# Patient Record
Sex: MALE | Race: WHITE | NOT HISPANIC OR LATINO | Employment: OTHER | URBAN - METROPOLITAN AREA
[De-identification: names, ages, dates, MRNs, and addresses within clinical notes are randomized per-mention and may not be internally consistent; named-entity substitution may affect disease eponyms.]

---

## 2018-02-10 ENCOUNTER — HOSPITAL ENCOUNTER (INPATIENT)
Facility: HOSPITAL | Age: 33
LOS: 3 days | Discharge: HOME | DRG: 186 | End: 2018-02-14
Attending: EMERGENCY MEDICINE | Admitting: HOSPITALIST
Payer: MEDICAID

## 2018-02-10 DIAGNOSIS — J90 PLEURAL EFFUSION: ICD-10-CM

## 2018-02-10 DIAGNOSIS — J18.9 PNEUMONIA OF RIGHT LOWER LOBE DUE TO INFECTIOUS ORGANISM: ICD-10-CM

## 2018-02-10 DIAGNOSIS — J90 PLEURAL EFFUSION ON RIGHT: Primary | ICD-10-CM

## 2018-02-10 DIAGNOSIS — R07.9 CHEST PAIN: ICD-10-CM

## 2018-02-10 DIAGNOSIS — J86.9 EMPYEMA: ICD-10-CM

## 2018-02-10 PROCEDURE — 99285 EMERGENCY DEPT VISIT HI MDM: CPT | Mod: 25

## 2018-02-10 PROCEDURE — 93005 ELECTROCARDIOGRAM TRACING: CPT

## 2018-02-10 PROCEDURE — 36000 PLACE NEEDLE IN VEIN: CPT

## 2018-02-10 PROCEDURE — 11000001 HC ACUTE MED/SURG PRIVATE ROOM

## 2018-02-10 RX ORDER — CEFTRIAXONE 1 G/1
1 INJECTION, POWDER, FOR SOLUTION INTRAMUSCULAR; INTRAVENOUS
Status: COMPLETED | OUTPATIENT
Start: 2018-02-10 | End: 2018-02-11

## 2018-02-11 ENCOUNTER — OUTSIDE PLACE OF SERVICE (OUTPATIENT)
Dept: PULMONOLOGY | Facility: CLINIC | Age: 33
End: 2018-02-11
Payer: MEDICAID

## 2018-02-11 PROBLEM — E87.1 HYPONATREMIA: Status: ACTIVE | Noted: 2018-02-11

## 2018-02-11 PROBLEM — J98.11 ATELECTASIS: Status: ACTIVE | Noted: 2018-02-11

## 2018-02-11 PROBLEM — J90 PLEURAL EFFUSION ON RIGHT: Status: ACTIVE | Noted: 2018-02-11

## 2018-02-11 PROBLEM — E87.6 HYPOKALEMIA: Status: ACTIVE | Noted: 2018-02-11

## 2018-02-11 LAB
ALBUMIN SERPL BCP-MCNC: 2.8 G/DL
ALP SERPL-CCNC: 65 U/L
ALT SERPL W/O P-5'-P-CCNC: 28 U/L
ANION GAP SERPL CALC-SCNC: 8 MMOL/L
AST SERPL-CCNC: 14 U/L
BASOPHILS # BLD AUTO: 0.1 K/UL
BASOPHILS NFR BLD: 0.7 %
BILIRUB SERPL-MCNC: 0.8 MG/DL
BUN SERPL-MCNC: 12 MG/DL
CALCIUM SERPL-MCNC: 9 MG/DL
CHLORIDE SERPL-SCNC: 100 MMOL/L
CO2 SERPL-SCNC: 27 MMOL/L
CREAT SERPL-MCNC: 0.9 MG/DL
DIFFERENTIAL METHOD: ABNORMAL
EOSINOPHIL # BLD AUTO: 0.3 K/UL
EOSINOPHIL NFR BLD: 1.8 %
ERYTHROCYTE [DISTWIDTH] IN BLOOD BY AUTOMATED COUNT: 13 %
EST. GFR  (AFRICAN AMERICAN): >60 ML/MIN/1.73 M^2
EST. GFR  (NON AFRICAN AMERICAN): >60 ML/MIN/1.73 M^2
GLUCOSE SERPL-MCNC: 123 MG/DL
HCT VFR BLD AUTO: 40.1 %
HGB BLD-MCNC: 13.4 G/DL
LYMPHOCYTES # BLD AUTO: 2.1 K/UL
LYMPHOCYTES NFR BLD: 12.6 %
MCH RBC QN AUTO: 28.7 PG
MCHC RBC AUTO-ENTMCNC: 33.5 G/DL
MCV RBC AUTO: 86 FL
MONOCYTES # BLD AUTO: 1.3 K/UL
MONOCYTES NFR BLD: 8 %
NEUTROPHILS # BLD AUTO: 12.5 K/UL
NEUTROPHILS NFR BLD: 76.9 %
PLATELET # BLD AUTO: 270 K/UL
PMV BLD AUTO: 7.8 FL
POTASSIUM SERPL-SCNC: 3.2 MMOL/L
PROT SERPL-MCNC: 6.4 G/DL
RBC # BLD AUTO: 4.69 M/UL
SODIUM SERPL-SCNC: 135 MMOL/L
WBC # BLD AUTO: 16.3 K/UL

## 2018-02-11 PROCEDURE — 25000003 PHARM REV CODE 250: Performed by: NURSE PRACTITIONER

## 2018-02-11 PROCEDURE — 25500020 PHARM REV CODE 255

## 2018-02-11 PROCEDURE — 63600175 PHARM REV CODE 636 W HCPCS: Performed by: EMERGENCY MEDICINE

## 2018-02-11 PROCEDURE — 63600175 PHARM REV CODE 636 W HCPCS: Performed by: HOSPITALIST

## 2018-02-11 PROCEDURE — 25000003 PHARM REV CODE 250: Performed by: HOSPITALIST

## 2018-02-11 PROCEDURE — 80053 COMPREHEN METABOLIC PANEL: CPT

## 2018-02-11 PROCEDURE — 11000001 HC ACUTE MED/SURG PRIVATE ROOM

## 2018-02-11 PROCEDURE — 63600175 PHARM REV CODE 636 W HCPCS: Performed by: NURSE PRACTITIONER

## 2018-02-11 PROCEDURE — 99222 1ST HOSP IP/OBS MODERATE 55: CPT | Mod: ,,, | Performed by: INTERNAL MEDICINE

## 2018-02-11 PROCEDURE — 87070 CULTURE OTHR SPECIMN AEROBIC: CPT

## 2018-02-11 PROCEDURE — 36415 COLL VENOUS BLD VENIPUNCTURE: CPT

## 2018-02-11 PROCEDURE — 85025 COMPLETE CBC W/AUTO DIFF WBC: CPT

## 2018-02-11 PROCEDURE — 96375 TX/PRO/DX INJ NEW DRUG ADDON: CPT

## 2018-02-11 PROCEDURE — 87205 SMEAR GRAM STAIN: CPT

## 2018-02-11 PROCEDURE — 27000221 HC OXYGEN, UP TO 24 HOURS

## 2018-02-11 PROCEDURE — 96365 THER/PROPH/DIAG IV INF INIT: CPT

## 2018-02-11 PROCEDURE — 99900035 HC TECH TIME PER 15 MIN (STAT)

## 2018-02-11 RX ORDER — SODIUM CHLORIDE 9 MG/ML
INJECTION, SOLUTION INTRAVENOUS
Status: DISPENSED
Start: 2018-02-11 | End: 2018-02-11

## 2018-02-11 RX ORDER — HYDROMORPHONE HYDROCHLORIDE 2 MG/ML
2 INJECTION, SOLUTION INTRAMUSCULAR; INTRAVENOUS; SUBCUTANEOUS EVERY 6 HOURS PRN
Status: DISCONTINUED | OUTPATIENT
Start: 2018-02-11 | End: 2018-02-13

## 2018-02-11 RX ORDER — IPRATROPIUM BROMIDE AND ALBUTEROL SULFATE 2.5; .5 MG/3ML; MG/3ML
3 SOLUTION RESPIRATORY (INHALATION) EVERY 6 HOURS
Status: DISCONTINUED | OUTPATIENT
Start: 2018-02-11 | End: 2018-02-11

## 2018-02-11 RX ORDER — ACETAMINOPHEN 325 MG/1
650 TABLET ORAL EVERY 6 HOURS PRN
Status: DISCONTINUED | OUTPATIENT
Start: 2018-02-11 | End: 2018-02-11

## 2018-02-11 RX ORDER — LANOLIN ALCOHOL/MO/W.PET/CERES
800 CREAM (GRAM) TOPICAL
Status: DISCONTINUED | OUTPATIENT
Start: 2018-02-11 | End: 2018-02-14 | Stop reason: HOSPADM

## 2018-02-11 RX ORDER — CEFTRIAXONE 2 G/50ML
2 INJECTION, SOLUTION INTRAVENOUS
Status: DISCONTINUED | OUTPATIENT
Start: 2018-02-12 | End: 2018-02-14 | Stop reason: HOSPADM

## 2018-02-11 RX ORDER — ONDANSETRON 2 MG/ML
8 INJECTION INTRAMUSCULAR; INTRAVENOUS EVERY 8 HOURS PRN
Status: DISCONTINUED | OUTPATIENT
Start: 2018-02-11 | End: 2018-02-14 | Stop reason: HOSPADM

## 2018-02-11 RX ORDER — OXYCODONE AND ACETAMINOPHEN 10; 325 MG/1; MG/1
1 TABLET ORAL EVERY 4 HOURS PRN
Status: DISCONTINUED | OUTPATIENT
Start: 2018-02-11 | End: 2018-02-14 | Stop reason: HOSPADM

## 2018-02-11 RX ORDER — POTASSIUM CHLORIDE 20 MEQ/15ML
40 SOLUTION ORAL
Status: DISCONTINUED | OUTPATIENT
Start: 2018-02-11 | End: 2018-02-14 | Stop reason: HOSPADM

## 2018-02-11 RX ORDER — SODIUM CHLORIDE 0.9 % (FLUSH) 0.9 %
5 SYRINGE (ML) INJECTION
Status: DISCONTINUED | OUTPATIENT
Start: 2018-02-11 | End: 2018-02-14 | Stop reason: HOSPADM

## 2018-02-11 RX ORDER — ACETAMINOPHEN 325 MG/1
650 TABLET ORAL EVERY 4 HOURS PRN
Status: DISCONTINUED | OUTPATIENT
Start: 2018-02-11 | End: 2018-02-11

## 2018-02-11 RX ORDER — HYDROCODONE POLISTIREX AND CHLORPHENIRAMINE POLISTIREX 10; 8 MG/5ML; MG/5ML
5 SUSPENSION, EXTENDED RELEASE ORAL EVERY 12 HOURS PRN
Status: DISCONTINUED | OUTPATIENT
Start: 2018-02-11 | End: 2018-02-14 | Stop reason: HOSPADM

## 2018-02-11 RX ORDER — HYDROMORPHONE HYDROCHLORIDE 2 MG/ML
1 INJECTION, SOLUTION INTRAMUSCULAR; INTRAVENOUS; SUBCUTANEOUS EVERY 6 HOURS PRN
Status: DISCONTINUED | OUTPATIENT
Start: 2018-02-11 | End: 2018-02-11

## 2018-02-11 RX ORDER — GLUCAGON 1 MG
1 KIT INJECTION
Status: DISCONTINUED | OUTPATIENT
Start: 2018-02-11 | End: 2018-02-14 | Stop reason: HOSPADM

## 2018-02-11 RX ORDER — CEFTRIAXONE 1 G/1
1 INJECTION, POWDER, FOR SOLUTION INTRAMUSCULAR; INTRAVENOUS ONCE
Status: COMPLETED | OUTPATIENT
Start: 2018-02-11 | End: 2018-02-11

## 2018-02-11 RX ORDER — IBUPROFEN 200 MG
24 TABLET ORAL
Status: DISCONTINUED | OUTPATIENT
Start: 2018-02-11 | End: 2018-02-14 | Stop reason: HOSPADM

## 2018-02-11 RX ORDER — ALPRAZOLAM 0.25 MG/1
0.25 TABLET ORAL 3 TIMES DAILY PRN
Status: DISCONTINUED | OUTPATIENT
Start: 2018-02-11 | End: 2018-02-14 | Stop reason: HOSPADM

## 2018-02-11 RX ORDER — ACETAMINOPHEN 500 MG
1000 TABLET ORAL EVERY 6 HOURS PRN
Status: DISCONTINUED | OUTPATIENT
Start: 2018-02-11 | End: 2018-02-14 | Stop reason: HOSPADM

## 2018-02-11 RX ORDER — PANTOPRAZOLE SODIUM 40 MG/1
40 TABLET, DELAYED RELEASE ORAL DAILY
Status: DISCONTINUED | OUTPATIENT
Start: 2018-02-11 | End: 2018-02-14 | Stop reason: HOSPADM

## 2018-02-11 RX ORDER — IBUPROFEN 200 MG
16 TABLET ORAL
Status: DISCONTINUED | OUTPATIENT
Start: 2018-02-11 | End: 2018-02-14 | Stop reason: HOSPADM

## 2018-02-11 RX ORDER — AMOXICILLIN 250 MG
1 CAPSULE ORAL 2 TIMES DAILY
Status: DISCONTINUED | OUTPATIENT
Start: 2018-02-11 | End: 2018-02-14 | Stop reason: HOSPADM

## 2018-02-11 RX ORDER — IPRATROPIUM BROMIDE AND ALBUTEROL SULFATE 2.5; .5 MG/3ML; MG/3ML
3 SOLUTION RESPIRATORY (INHALATION) EVERY 6 HOURS PRN
Status: DISCONTINUED | OUTPATIENT
Start: 2018-02-11 | End: 2018-02-12

## 2018-02-11 RX ORDER — ALPRAZOLAM 0.25 MG/1
0.25 TABLET ORAL ONCE
Status: COMPLETED | OUTPATIENT
Start: 2018-02-11 | End: 2018-02-11

## 2018-02-11 RX ORDER — SODIUM CHLORIDE 9 MG/ML
INJECTION, SOLUTION INTRAVENOUS CONTINUOUS
Status: DISCONTINUED | OUTPATIENT
Start: 2018-02-11 | End: 2018-02-11

## 2018-02-11 RX ORDER — RAMELTEON 8 MG/1
8 TABLET ORAL NIGHTLY PRN
Status: DISCONTINUED | OUTPATIENT
Start: 2018-02-11 | End: 2018-02-14 | Stop reason: HOSPADM

## 2018-02-11 RX ADMIN — HYDROMORPHONE HYDROCHLORIDE 1 MG: 2 INJECTION, SOLUTION INTRAMUSCULAR; INTRAVENOUS; SUBCUTANEOUS at 09:02

## 2018-02-11 RX ADMIN — IOHEXOL 75 ML: 350 INJECTION, SOLUTION INTRAVENOUS at 02:02

## 2018-02-11 RX ADMIN — HYDROMORPHONE HYDROCHLORIDE 2 MG: 2 INJECTION, SOLUTION INTRAMUSCULAR; INTRAVENOUS; SUBCUTANEOUS at 03:02

## 2018-02-11 RX ADMIN — SODIUM CHLORIDE: 0.9 INJECTION, SOLUTION INTRAVENOUS at 01:02

## 2018-02-11 RX ADMIN — POTASSIUM CHLORIDE 40 MEQ: 20 SOLUTION ORAL at 02:02

## 2018-02-11 RX ADMIN — SODIUM CHLORIDE: 0.9 INJECTION, SOLUTION INTRAVENOUS at 11:02

## 2018-02-11 RX ADMIN — HYDROMORPHONE HYDROCHLORIDE 1 MG: 2 INJECTION, SOLUTION INTRAMUSCULAR; INTRAVENOUS; SUBCUTANEOUS at 02:02

## 2018-02-11 RX ADMIN — HYDROCODONE POLISTIREX AND CHLORPHENIRAMINE POLISITREX 5 ML: 10; 8 SUSPENSION, EXTENDED RELEASE ORAL at 10:02

## 2018-02-11 RX ADMIN — HYDROMORPHONE HYDROCHLORIDE 2 MG: 2 INJECTION, SOLUTION INTRAMUSCULAR; INTRAVENOUS; SUBCUTANEOUS at 09:02

## 2018-02-11 RX ADMIN — ACETAMINOPHEN 1000 MG: 500 TABLET, FILM COATED ORAL at 09:02

## 2018-02-11 RX ADMIN — CEFTRIAXONE SODIUM 1 G: 1 INJECTION, POWDER, FOR SOLUTION INTRAMUSCULAR; INTRAVENOUS at 03:02

## 2018-02-11 RX ADMIN — VANCOMYCIN HYDROCHLORIDE 1250 MG: 750 INJECTION, POWDER, LYOPHILIZED, FOR SOLUTION INTRAVENOUS at 03:02

## 2018-02-11 RX ADMIN — CEFTRIAXONE SODIUM 1 G: 1 INJECTION, POWDER, FOR SOLUTION INTRAMUSCULAR; INTRAVENOUS at 12:02

## 2018-02-11 RX ADMIN — VANCOMYCIN HYDROCHLORIDE 1250 MG: 750 INJECTION, POWDER, LYOPHILIZED, FOR SOLUTION INTRAVENOUS at 12:02

## 2018-02-11 RX ADMIN — ALPRAZOLAM 0.25 MG: 0.25 TABLET ORAL at 06:02

## 2018-02-11 RX ADMIN — AZITHROMYCIN FOR INJECTION INJECTION, POWDER, LYOPHILIZED, FOR SOLUTION 500 MG: 500 INJECTION INTRAVENOUS at 12:02

## 2018-02-11 NOTE — PROGRESS NOTES
Pulmonary consulted. Note still pending. Staff report patient is for a thoracentesis for in am. Patient reports increased anxiety and SOB. Will HL IVF and add prn xanax and supplemental O2. Monitor closely for any decline in condition.

## 2018-02-11 NOTE — CONSULTS
Zander Boyer [id number] is a 32 y.o. male who has been consulted for vancomycin dosing.    The patient has the following labs:     Date Creatinine (mg/dl)    BUN WBC Count   2/11/2018 Estimated Creatinine Clearance: 155.2 mL/min (based on SCr of 0.9 mg/dL). Lab Results   Component Value Date    BUN 12 02/11/2018     Lab Results   Component Value Date    WBC 16.30 (H) 02/11/2018        Current weight is 104.8 kg (231 lb 1.6 oz)    Pneumonia    The patient will be started on vancomycin at a dose of 1250 mg every 8 hours (12 mg/kg/dose).  The vancomycin trough has been ordered for 2/12 at 0330.      Patient will be followed by pharmacy for changes in renal function, toxicity, and efficacy.   Thank you for allowing us to participate in this patient's care.     Nena Nayak

## 2018-02-11 NOTE — ED PROVIDER NOTES
Encounter Date: 2/10/2018    SCRIBE #1 NOTE: I, Jonna Palmer, am scribing for, and in the presence of, Dr. Galvan.       History     Chief Complaint   Patient presents with    Chest Pain     Sent here by urgent care and was told he had fluid in Rt lung       02/10/2018 10:26 PM     Chief complaint: Chest pain      Zander Boyer is a 32 y.o. male who presents to the ED per advice from urgent care with an onset of constant right-sided chest and back pain that started about 1 wk ago. The patient states that the urgent care told him that he has fluid in his lungs. The pain is exacerbated by coughing and lying flat. The patient also endorses SOB when lying flat, fever, and chills. He states that he experienced mild cold symptoms about 2 wks ago, but that he hasn't been sick otherwise. There is no SHx noted.  Smokes cigarettes but no drug abuse no alcohol.      The history is provided by the patient.     Review of patient's allergies indicates:  No Known Allergies  History reviewed. No pertinent past medical history.  History reviewed. No pertinent surgical history.  History reviewed. No pertinent family history.  Social History   Substance Use Topics    Smoking status: Current Every Day Smoker     Packs/day: 1.00    Smokeless tobacco: Former User    Alcohol use No     Review of Systems   Constitutional: Positive for chills and fever.   Respiratory: Positive for cough and shortness of breath.    Cardiovascular: Positive for chest pain.   Musculoskeletal: Positive for back pain.   All other systems reviewed and are negative.      Physical Exam     Initial Vitals [02/10/18 2206]   BP Pulse Resp Temp SpO2   (!) 140/73 97 16 98.4 °F (36.9 °C) 97 %      MAP       95.33         Physical Exam    Nursing note and vitals reviewed.  Constitutional: He appears well-developed and well-nourished. He is not diaphoretic.  Non-toxic appearance. He does not have a sickly appearance. He does not appear ill. No distress.   HENT:    Head: Normocephalic and atraumatic.   Eyes: EOM are normal.   Neck: Normal range of motion. Neck supple. Normal range of motion present. No neck rigidity.   Cardiovascular: Normal rate, regular rhythm and normal heart sounds. Exam reveals no gallop and no friction rub.    No murmur heard.  Pulmonary/Chest: No respiratory distress.   There are decreased breath sounds on the right lung base.   Abdominal: Soft. He exhibits no distension. There is no tenderness. There is no rebound.   Musculoskeletal: Normal range of motion.   Neurological: He is alert and oriented to person, place, and time.   Skin: Skin is warm and dry. No rash noted.   Psychiatric: He has a normal mood and affect. His behavior is normal. Judgment and thought content normal.         ED Course   [place] - ED Performed - [facility]  Date/Time: 2/11/2018 12:34 PM  Performed by: KHURRAM RABAGO  Authorized by: PRASHANTH YAÑEZ   Comments: Right antecubital IV started by M.D. under ultrasound    US - ED Performed - [facility]  Date/Time: 2/11/2018 12:35 PM  Performed by: KHURRAM RABAGO  Authorized by: PRASHANTH YAÑEZ   Comments: Right arm basilic vein IV started by M.D. under ultrasound        Labs Reviewed - No data to display     Imaging Results    None            Medical Decision Making:   History:   Old Medical Records: I decided to obtain old medical records.  Clinical Tests:   Lab Tests: Ordered and Reviewed  Radiological Study: Ordered and Reviewed  Medical Tests: Ordered and Reviewed            Scribe Attestation:   Scribe #1: I performed the above scribed service and the documentation accurately describes the services I performed. I attest to the accuracy of the note.    I, Dr. Rabago, personally performed the services described in this documentation. All medical record entries made by the scribe were at my direction and in my presence.  I have reviewed the chart and agree that the record reflects my personal  "performance and is accurate and complete.12:35 PM 02/11/2018            ED Course as of Feb 11 1234   Sun Feb 11, 2018   0007 Freda to admit, will hold in Er until labs back and imaging done.  [EF]      ED Course User Index  [EF] Ricky Galvan MD     Clinical Impression:   Diagnoses of Chest pain and Pleural effusion were pertinent to this visit.          32-year-old male referred from urgent care for "fluid in the lung."  On physical exam he has decreased breath sounds at the right lung base but he is in no distress.  He meets no criteria for sepsis.  Very difficult vascular access, I had to place 2 ultrasound-guided IVs and draw the blood myself.  Patient was signed over to hospital medicine with labs and imaging pending.  I did review chest x-ray done at urgent care which demonstrates a moderate right pleural effusion but no apparent pneumothorax.  I doubt pulmonary embolus.  Symptoms are almost certainly infectious and probably a parapneumonic effusion.  He does not need an emergent thoracentesis.  He is in absolutely no distress at all. NANCY tello by phone to admit.                  Ricky Galvan MD  02/11/18 1237    "

## 2018-02-11 NOTE — ASSESSMENT & PLAN NOTE
Pneumonia/ Possible Empyema/ Atelectasis-  Continue supplemental O2   Pt refusing aerosol treatments  Pulmonology following  Pt for for diagnostic and therapeutic thoracentesis today  Continue IV antibiotics - Pt currently on Iv Rocephin

## 2018-02-11 NOTE — PLAN OF CARE
Problem: Patient Care Overview  Goal: Plan of Care Review  Outcome: Ongoing (interventions implemented as appropriate)  A&Ox4. Up independently. IVF infusing at KVO. IV antibiotics infused per order. VSS. Remains afebrile throughout shift. Remains fall-free throughout shift. Comfort level established. Mild to no pain control with prn medications. O2 PRN initiated. Bed low, brakes locked, SR up x2, call light within reach. Verbalized understanding of poc. Open communication facilitated. Will continue to monitor.

## 2018-02-11 NOTE — SUBJECTIVE & OBJECTIVE
History reviewed. No pertinent past medical history.    History reviewed. No pertinent surgical history.    Review of patient's allergies indicates:  No Known Allergies    No current facility-administered medications on file prior to encounter.      No current outpatient prescriptions on file prior to encounter.     Family History     Problem Relation (Age of Onset)    No Known Problems Mother, Brother        Social History Main Topics    Smoking status: Current Every Day Smoker     Packs/day: 1.00    Smokeless tobacco: Former User    Alcohol use Not on file    Drug use: No    Sexual activity: Yes     Partners: Female     Review of Systems   Constitutional: Positive for chills and fever. Negative for activity change, appetite change and fatigue.   HENT: Negative for congestion, hearing loss, sore throat and trouble swallowing.    Eyes: Negative for photophobia and visual disturbance.   Respiratory: Positive for cough and shortness of breath. Negative for wheezing.    Cardiovascular: Positive for chest pain (Right sided ). Negative for palpitations and leg swelling.   Gastrointestinal: Negative for abdominal pain, diarrhea, nausea and vomiting.   Endocrine: Negative for polydipsia, polyphagia and polyuria.   Genitourinary: Negative for difficulty urinating, dysuria, frequency and urgency.   Musculoskeletal: Positive for back pain (Right sided). Negative for neck pain and neck stiffness.   Skin: Negative for rash and wound.   Neurological: Negative for dizziness, syncope, weakness, numbness and headaches.   Psychiatric/Behavioral: Negative for confusion. The patient is not nervous/anxious.      Objective:     Vital Signs (Most Recent):  Temp: 99.6 °F (37.6 °C) (02/11/18 0144)  Pulse: 79 (02/11/18 0144)  Resp: 16 (02/11/18 0144)  BP: (!) 101/59 (02/11/18 0144)  SpO2: (!) 93 % (02/11/18 0144) Vital Signs (24h Range):  Temp:  [98.4 °F (36.9 °C)-99.6 °F (37.6 °C)] 99.6 °F (37.6 °C)  Pulse:  [79-97] 79  Resp:  [16-18]  16  SpO2:  [93 %-97 %] 93 %  BP: (101-140)/(55-73) 101/59     Weight: 104.8 kg (231 lb 1.6 oz)  Body mass index is 28.13 kg/m².    Physical Exam   Constitutional: He is oriented to person, place, and time. He appears well-developed and well-nourished. No distress.   HENT:   Head: Normocephalic and atraumatic.   Eyes: Conjunctivae and EOM are normal. Pupils are equal, round, and reactive to light. No scleral icterus.   Neck: Normal range of motion. Neck supple. No JVD present. No tracheal deviation present. No thyromegaly present.   Cardiovascular: Normal rate, regular rhythm, normal heart sounds and intact distal pulses.  Exam reveals no gallop and no friction rub.    No murmur heard.  Pulses:       Dorsalis pedis pulses are 2+ on the right side, and 2+ on the left side.   Pulmonary/Chest: Effort normal. No accessory muscle usage. No respiratory distress. He has decreased breath sounds in the right middle field and the right lower field. He has no wheezes. He has no rhonchi. He has no rales.   Abdominal: Soft. Bowel sounds are normal. He exhibits no distension and no mass. There is no tenderness. There is no guarding.   Musculoskeletal: Normal range of motion. He exhibits no edema, tenderness or deformity.   Neurological: He is alert and oriented to person, place, and time. He has normal strength. He exhibits normal muscle tone. Coordination normal.   Skin: Skin is warm, dry and intact. Capillary refill takes less than 2 seconds. No rash noted. He is not diaphoretic. No erythema.   Psychiatric: He has a normal mood and affect. His speech is normal and behavior is normal. Judgment and thought content normal.   Vitals reviewed.        CRANIAL NERVES     CN III, IV, VI   Pupils are equal, round, and reactive to light.  Extraocular motions are normal.        Significant Labs:   CBC:   Recent Labs  Lab 02/11/18  0001   WBC 16.30*   HGB 13.4*   HCT 40.1        CMP:   Recent Labs  Lab 02/11/18  0001   *   K  3.2*      CO2 27   *   BUN 12   CREATININE 0.9   CALCIUM 9.0   PROT 6.4   ALBUMIN 2.8*   BILITOT 0.8   ALKPHOS 65   AST 14   ALT 28   ANIONGAP 8   EGFRNONAA >60     Significant Imaging: I have reviewed all pertinent imaging results/findings within the past 24 hours.     CXR: [facility] IMPRESSION:  Combination of right lung infiltrate and pleural fluid with at least a component of this fluid appearing  free-flowing. Underlying loculation and mass cannot be excluded. CT may be considered for further  evaluation.

## 2018-02-11 NOTE — ASSESSMENT & PLAN NOTE
?Etiology - will empirically treat for infectious process considering symptomology, CXR concerning for possible empyema.  Will check CT scan of chest  Consult Pulmonology for diagnostic and therapeutic thoracentesis and follow recommendations.  IV antibiotics - Rocephin and Vancomycin  Monitor vital signs closely  Daily labs  PT/INR  Check Lactic Acid level and procalcitonin

## 2018-02-11 NOTE — CONSULTS
Pulmonary/Critical Care Consult      Patient name: Zander Boyer  MRN: [id number]  Date: 02/11/2018    Admit Date: 2/10/2018  Consult Requested By: Timo Kirkpatrick MD    Reason for Consult: Pneumonia and complicated pleural effusion    HPI:    33 yo male who has been sick for a week or so with cough (purulent sputum), possible fever, progressive pleuritic pain now admitted for further evaluation and treatment.  Currently has cough with congestion but trouble bringing up sputum due to pain.  Denies any history of chronic lung disease but has been a smoker.    Review of Systems    Review of Systems   Constitutional: Positive for fever and malaise/fatigue. Negative for chills, diaphoresis and weight loss.   HENT: Negative for congestion.    Eyes: Negative for pain.   Respiratory: Positive for cough, sputum production and shortness of breath. Negative for hemoptysis, wheezing and stridor.    Cardiovascular: Positive for chest pain. Negative for palpitations, orthopnea, claudication, leg swelling and PND.        Right, pleuritic   Gastrointestinal: Negative for abdominal pain, constipation, diarrhea, heartburn, nausea and vomiting.   Genitourinary: Negative for dysuria, frequency and urgency.   Musculoskeletal: Negative for falls and myalgias.   Neurological: Negative for sensory change, focal weakness and weakness.   Psychiatric/Behavioral: Negative for depression. The patient is not nervous/anxious.        Past Medical History    History reviewed. No pertinent past medical history.    Past Surgical History    History reviewed. No pertinent surgical history.    Medications (scheduled):      albuterol-ipratropium 2.5mg-0.5mg/3mL  3 mL Nebulization Q6H    ALPRAZolam  0.25 mg Oral Once    [START ON 2/12/2018] cefTRIAXone (ROCEPHIN) IVPB  2 g Intravenous Q24H    pantoprazole  40 mg Oral Daily    senna-docusate 8.6-50 mg  1 tablet Oral BID       Medications (infusions):         Medications (prn):     acetaminophen,  "ALPRAZolam, dextrose 50%, dextrose 50%, glucagon (human recombinant), glucose, glucose, hydrocodone-chlorpheniramine, HYDROmorphone, magnesium oxide, magnesium oxide, ondansetron, oxyCODONE-acetaminophen, potassium chloride 10%, potassium chloride 10%, ramelteon, sodium chloride 0.9%    Family History:   Family History   Problem Relation Age of Onset    No Known Problems Mother     No Known Problems Brother        Social History: Tobacco:   History   Smoking Status    Current Every Day Smoker    Packs/day: 1.00   Smokeless Tobacco    Former User                                EtOH:   History   Alcohol use Not on file                                Drugs:   History   Drug Use No                                Occupation: construction,  work                              Asbestos exposure: no    Physical Exam    Vital signs:  Temp:  [98.4 °F (36.9 °C)-99.6 °F (37.6 °C)]   Pulse:  [79-97]   Resp:  [15-20]   BP: (101-140)/(55-73)   SpO2:  [93 %-97 %]     Intake/Output: No intake or output data in the 24 hours ending 02/11/18 5125     BMI: Estimated body mass index is 28.13 kg/m² as calculated from the following:    Height as of this encounter: 6' 4" (1.93 m).    Weight as of this encounter: 104.8 kg (231 lb 1.6 oz).    Physical Exam   Constitutional: He is oriented to person, place, and time and well-developed, well-nourished, and in no distress. No distress.   HENT:   Head: Normocephalic and atraumatic.   Mouth/Throat: Oropharynx is clear and moist.   Eyes: Conjunctivae and EOM are normal. Pupils are equal, round, and reactive to light.   Neck: Normal range of motion. Neck supple. No JVD present. No tracheal deviation present. No thyromegaly present.   Cardiovascular: Normal rate and normal heart sounds.  Exam reveals no gallop and no friction rub.    No murmur heard.  Pulmonary/Chest: Effort normal. No stridor. No respiratory distress. He has no wheezes. He has no rales.   Decreased bs right side "   Abdominal: Soft. Bowel sounds are normal. He exhibits no distension. There is no tenderness. There is no rebound.   Musculoskeletal: Normal range of motion. He exhibits no edema or tenderness.   Neurological: He is alert and oriented to person, place, and time.   Skin: He is not diaphoretic.   Vitals reviewed.      Laboratory      Recent Labs  Lab 02/11/18  0001   WBC 16.30*   RBC 4.69   HGB 13.4*   HCT 40.1      MCV 86   MCH 28.7   MCHC 33.5         Recent Labs  Lab 02/11/18  0001   CALCIUM 9.0   PROT 6.4   *   K 3.2*   CO2 27      BUN 12   CREATININE 0.9   ALKPHOS 65   ALT 28   AST 14   BILITOT 0.8       No results for input(s): PT, INR, APTT in the last 24 hours.    No results for input(s): CPK, CPKMB, TROPONINI, MB in the last 24 hours.    Additional labs: reviewed    Microbiology:       Microbiology Results (last 7 days)     Procedure Component Value Units Date/Time    Culture, Respiratory [[id number]] Collected:  02/11/18 0315    Order Status:  Completed Specimen:  Respiratory from Sputum, Expectorated Updated:  02/11/18 1252     Gram Stain (Respiratory) >10 epithelial cells per low power field     Gram Stain (Respiratory) Many WBC's     Gram Stain (Respiratory) Many Gram positive rods     Gram Stain (Respiratory) Few Gram positive cocci     Gram Stain (Respiratory) Rare budding yeast          Radiology    Imaging Results          CT Chest With Contrast (Final result)  Result time 02/11/18 07:40:18    Final result by Tom Sal MD (02/11/18 07:40:18)                 Impression:     Large partially posterior laterally and inferiorly loculated right-sided pleural effusion with compressive atelectasis.    No definite pneumothorax or rib fracture      Electronically signed by: TOM SAL MD  Date:     02/11/18  Time:    07:40              Narrative:    The pulmonary and final reports are concordant. Axial images are obtained through the chest. 75 cc of IV and 350 was given.  Correlation is made with a decubitus chest x-ray dated February 11, 2018. A moderate size partially posterolaterally loculated right-sided pleural effusion is evident. There is compressive atelectasis in the right base. Borderline enlarged subcarinal lower right paratracheal and right hilar lymph nodes are evident. The left lung is clear.                             X-Ray Chest PA And Lateral (Final result)  Result time 02/11/18 07:42:05    Final result by Tom Sal MD (02/11/18 07:42:05)                 Impression:     Large right-sided pleural effusion with partial loculation and associated atelectasis versus infiltration      Electronically signed by: TOM SAL MD  Date:     02/11/18  Time:    07:41              Narrative:    2 views of the chest in addition to right and left lateral decubitus views are performed at 00 43 hours and acute decubitus views were performed separately at 0048 hours on 211 2018. Images demonstrate a moderately large right-sided pleural effusion which only partially layers upon decubitus positioning. The left lung is clear. The heart is not enlarged. A CT scan was performed subsequently and is interpreted separately. There is no evidence of pneumothorax or rib fracture.                             X-Ray Chest Lateral Decubitus Right (Final result)  Result time 02/11/18 07:42:08    Final result by Tom Sal MD (02/11/18 07:42:08)                 Impression:     Large right-sided pleural effusion with partial loculation and associated atelectasis versus infiltration      Electronically signed by: TOM SAL MD  Date:     02/11/18  Time:    07:41              Narrative:    2 views of the chest in addition to right and left lateral decubitus views are performed at 00 43 hours and acute decubitus views were performed separately at 0048 hours on 211 2018. Images demonstrate a moderately large right-sided pleural effusion which only partially layers upon decubitus  positioning. The left lung is clear. The heart is not enlarged. A CT scan was performed subsequently and is interpreted separately. There is no evidence of pneumothorax or rib fracture.                             RADIOLOGY REPORT (Final result)  Result time 02/11/18 09:01:23                Additional Studies    reviewed    Ventilator Information              No results for input(s): PH, PCO2, PO2, HCO3, POCSATURATED, BE in the last 24 hours.    Impression/Plan      ICD-10-CM ICD-9-CM   1. Chest pain R07.9 786.50   2. Pleural effusion J90 511.9   3. Pleural effusion on right J90 511.9     Abnormal CXR/CT - complicated pleural effusion  - most likely pneumonia and a complicated pleural effusion (or possible empyema)  - continue antibiotics  - will arrange for us guided tap for tomorrow (but I suspect that we will not be able to get the pleural space cleared)  - may need surgical evaluation (his has been discussed with pt)  - pain control  - add steroids    Thank you for this consult.  I will follow with you while the patient is hospitalized.  Please call ([phone]) if you have any questions.    Chase Ramirez MD

## 2018-02-11 NOTE — HPI
Zander Boyer is a 33 yo male with no significant medical or surgical history.  He is admitted to the service of hospital medicine with a diagnosis of right pleural effusion.  He presented to the ED today per advice from Urgent Care who told him he had fluid on his lung.  His symptoms began 1 1/2 weeks ago with right sided back pain he believed was a pulled muscle.  The pain began to wrap around to his right chest and he began having nonproductive cough and chills with fever with tmax 102F.  The pain is exacerbated by coughing and lying flat. The patient also endorses SOB when lying flat. He states that he experienced mild cold symptoms about 2 wks ago, but that he hasn't been sick otherwise. He denies HA, abdomen pain, n/v and diarrhea. Patient evaluated in ER and found to have significant right pleural effusion and admitted for further evaluation and treatment.

## 2018-02-11 NOTE — CONSULTS
Zander Boyer [id number] is a 32 y.o. male who has been consulted for vancomycin dosing.    Pharmacy consult for vancomycin dosing in no longer required.  Vancomycin was discontinued.    Thank you for allowing us to participate in this patient's care.     -Zahida Coe, PharmD

## 2018-02-11 NOTE — PLAN OF CARE
Problem: Patient Care Overview  Goal: Plan of Care Review  Outcome: Revised  Pt is AAOx4.  Pt c/o pain, prn medication given, pt tolerated well.  IVF infusing, abx given as ordered, no redness or swelling at site.  Pt is continent of B and B, up ad viviana.  VSS, in NAD, pt remains afebrile.  Pt remains free from injury.  Bed in low position, wheels locked, call light within reach.  Pt verbalized understanding of POC.  Will continue to monitor.

## 2018-02-11 NOTE — PLAN OF CARE
02/11/18 0150   Aerosol Therapy   $ Aerosol Therapy Charges Refused  (Maggi Dumont NP aware)   Respiratory Treatment Status refused   Pt refused therapy at this time. Pt states he does not want treatments because they make him cough too much and his side hurts.

## 2018-02-12 PROBLEM — F17.200 NICOTINE DEPENDENCE: Status: ACTIVE | Noted: 2018-02-12

## 2018-02-12 PROBLEM — J18.9 PNEUMONIA: Status: ACTIVE | Noted: 2018-02-12

## 2018-02-12 PROBLEM — J86.9 EMPYEMA: Status: ACTIVE | Noted: 2018-02-12

## 2018-02-12 PROBLEM — R73.9 HYPERGLYCEMIA: Status: ACTIVE | Noted: 2018-02-12

## 2018-02-12 LAB
ANION GAP SERPL CALC-SCNC: 7 MMOL/L
BASOPHILS # BLD AUTO: 0 K/UL
BASOPHILS NFR BLD: 0 %
BUN SERPL-MCNC: 8 MG/DL
CALCIUM SERPL-MCNC: 9.1 MG/DL
CHLORIDE SERPL-SCNC: 102 MMOL/L
CO2 SERPL-SCNC: 26 MMOL/L
CREAT SERPL-MCNC: 0.8 MG/DL
DIFFERENTIAL METHOD: ABNORMAL
EOSINOPHIL # BLD AUTO: 0.1 K/UL
EOSINOPHIL NFR BLD: 0.8 %
ERYTHROCYTE [DISTWIDTH] IN BLOOD BY AUTOMATED COUNT: 12.8 %
EST. GFR  (AFRICAN AMERICAN): >60 ML/MIN/1.73 M^2
EST. GFR  (NON AFRICAN AMERICAN): >60 ML/MIN/1.73 M^2
GLUCOSE SERPL-MCNC: 126 MG/DL
HCT VFR BLD AUTO: 40 %
HGB BLD-MCNC: 13.5 G/DL
INR PPP: 1.1
LYMPHOCYTES # BLD AUTO: 1.2 K/UL
LYMPHOCYTES NFR BLD: 9.9 %
MAGNESIUM SERPL-MCNC: 1.9 MG/DL
MCH RBC QN AUTO: 28.5 PG
MCHC RBC AUTO-ENTMCNC: 33.8 G/DL
MCV RBC AUTO: 84 FL
MONOCYTES # BLD AUTO: 0.7 K/UL
MONOCYTES NFR BLD: 5.9 %
NEUTROPHILS # BLD AUTO: 9.8 K/UL
NEUTROPHILS NFR BLD: 83.4 %
PLATELET # BLD AUTO: 328 K/UL
PMV BLD AUTO: 7.3 FL
POTASSIUM SERPL-SCNC: 3.8 MMOL/L
PROTHROMBIN TIME: 10.7 SEC
RBC # BLD AUTO: 4.75 M/UL
SODIUM SERPL-SCNC: 135 MMOL/L
WBC # BLD AUTO: 11.8 K/UL

## 2018-02-12 PROCEDURE — 36415 COLL VENOUS BLD VENIPUNCTURE: CPT

## 2018-02-12 PROCEDURE — C1751 CATH, INF, PER/CENT/MIDLINE: HCPCS

## 2018-02-12 PROCEDURE — 88305 TISSUE EXAM BY PATHOLOGIST: CPT | Mod: 26,,,

## 2018-02-12 PROCEDURE — 88112 CYTOPATH CELL ENHANCE TECH: CPT | Mod: 26,,,

## 2018-02-12 PROCEDURE — 25000003 PHARM REV CODE 250: Performed by: HOSPITALIST

## 2018-02-12 PROCEDURE — 85025 COMPLETE CBC W/AUTO DIFF WBC: CPT

## 2018-02-12 PROCEDURE — 0W993ZZ DRAINAGE OF RIGHT PLEURAL CAVITY, PERCUTANEOUS APPROACH: ICD-10-PCS | Performed by: RADIOLOGY

## 2018-02-12 PROCEDURE — 02HV33Z INSERTION OF INFUSION DEVICE INTO SUPERIOR VENA CAVA, PERCUTANEOUS APPROACH: ICD-10-PCS | Performed by: HOSPITALIST

## 2018-02-12 PROCEDURE — 36569 INSJ PICC 5 YR+ W/O IMAGING: CPT

## 2018-02-12 PROCEDURE — 88305 TISSUE EXAM BY PATHOLOGIST: CPT

## 2018-02-12 PROCEDURE — 82945 GLUCOSE OTHER FLUID: CPT

## 2018-02-12 PROCEDURE — 87070 CULTURE OTHR SPECIMN AEROBIC: CPT

## 2018-02-12 PROCEDURE — 83615 LACTATE (LD) (LDH) ENZYME: CPT

## 2018-02-12 PROCEDURE — 80048 BASIC METABOLIC PNL TOTAL CA: CPT

## 2018-02-12 PROCEDURE — 63600175 PHARM REV CODE 636 W HCPCS: Performed by: HOSPITALIST

## 2018-02-12 PROCEDURE — 84157 ASSAY OF PROTEIN OTHER: CPT

## 2018-02-12 PROCEDURE — 85610 PROTHROMBIN TIME: CPT

## 2018-02-12 PROCEDURE — 76937 US GUIDE VASCULAR ACCESS: CPT

## 2018-02-12 PROCEDURE — 99900035 HC TECH TIME PER 15 MIN (STAT)

## 2018-02-12 PROCEDURE — 87102 FUNGUS ISOLATION CULTURE: CPT

## 2018-02-12 PROCEDURE — 87116 MYCOBACTERIA CULTURE: CPT

## 2018-02-12 PROCEDURE — 94761 N-INVAS EAR/PLS OXIMETRY MLT: CPT

## 2018-02-12 PROCEDURE — A4216 STERILE WATER/SALINE, 10 ML: HCPCS | Performed by: HOSPITALIST

## 2018-02-12 PROCEDURE — 83735 ASSAY OF MAGNESIUM: CPT

## 2018-02-12 PROCEDURE — 87205 SMEAR GRAM STAIN: CPT

## 2018-02-12 PROCEDURE — 99232 SBSQ HOSP IP/OBS MODERATE 35: CPT | Mod: ,,, | Performed by: INTERNAL MEDICINE

## 2018-02-12 PROCEDURE — 11000001 HC ACUTE MED/SURG PRIVATE ROOM

## 2018-02-12 RX ORDER — SODIUM CHLORIDE 0.9 % (FLUSH) 0.9 %
10 SYRINGE (ML) INJECTION
Status: DISCONTINUED | OUTPATIENT
Start: 2018-02-12 | End: 2018-02-14 | Stop reason: HOSPADM

## 2018-02-12 RX ORDER — IBUPROFEN 200 MG
16 TABLET ORAL
Status: DISCONTINUED | OUTPATIENT
Start: 2018-02-12 | End: 2018-02-12

## 2018-02-12 RX ORDER — GLUCAGON 1 MG
1 KIT INJECTION
Status: DISCONTINUED | OUTPATIENT
Start: 2018-02-12 | End: 2018-02-12

## 2018-02-12 RX ORDER — INSULIN ASPART 100 [IU]/ML
0-5 INJECTION, SOLUTION INTRAVENOUS; SUBCUTANEOUS
Status: DISCONTINUED | OUTPATIENT
Start: 2018-02-12 | End: 2018-02-14

## 2018-02-12 RX ORDER — IPRATROPIUM BROMIDE AND ALBUTEROL SULFATE 2.5; .5 MG/3ML; MG/3ML
3 SOLUTION RESPIRATORY (INHALATION) EVERY 8 HOURS
Status: DISCONTINUED | OUTPATIENT
Start: 2018-02-13 | End: 2018-02-14 | Stop reason: HOSPADM

## 2018-02-12 RX ORDER — IBUPROFEN 200 MG
24 TABLET ORAL
Status: DISCONTINUED | OUTPATIENT
Start: 2018-02-12 | End: 2018-02-12

## 2018-02-12 RX ORDER — SODIUM CHLORIDE 0.9 % (FLUSH) 0.9 %
10 SYRINGE (ML) INJECTION EVERY 6 HOURS
Status: DISCONTINUED | OUTPATIENT
Start: 2018-02-12 | End: 2018-02-14 | Stop reason: HOSPADM

## 2018-02-12 RX ADMIN — HYDROMORPHONE HYDROCHLORIDE 2 MG: 2 INJECTION, SOLUTION INTRAMUSCULAR; INTRAVENOUS; SUBCUTANEOUS at 04:02

## 2018-02-12 RX ADMIN — HYDROMORPHONE HYDROCHLORIDE 2 MG: 2 INJECTION, SOLUTION INTRAMUSCULAR; INTRAVENOUS; SUBCUTANEOUS at 05:02

## 2018-02-12 RX ADMIN — SODIUM CHLORIDE, PRESERVATIVE FREE 10 ML: 5 INJECTION INTRAVENOUS at 05:02

## 2018-02-12 RX ADMIN — SODIUM CHLORIDE, PRESERVATIVE FREE 10 ML: 5 INJECTION INTRAVENOUS at 12:02

## 2018-02-12 NOTE — PROGRESS NOTES
[facility] Hospital Medicine  Progress Note    Patient Name: Zander Boyer  MRN: [id number]  Patient Class: IP- Inpatient   Admission Date: 2/10/2018  Length of Stay: 1 days  Attending Physician: Timo Kirkpatrick MD  Primary Care Provider: Primary Doctor No        Subjective:     Principal Problem:Pleural effusion on right    HPI:  Zander Boyer is a 31 yo male with no significant medical or surgical history.  He is admitted to the service of hospital medicine with a diagnosis of right pleural effusion.  He presented to the ED today per advice from Urgent Care who told him he had fluid on his lung.  His symptoms began 1 1/2 weeks ago with right sided back pain he believed was a pulled muscle.  The pain began to wrap around to his right chest and he began having nonproductive cough and chills with fever with tmax 102F.  The pain is exacerbated by coughing and lying flat. The patient also endorses SOB when lying flat. He states that he experienced mild cold symptoms about 2 wks ago, but that he hasn't been sick otherwise. He denies HA, abdomen pain, n/v and diarrhea. Patient evaluated in ER and found to have significant right pleural effusion and admitted for further evaluation and treatment.      Hospital Course:  The patient was monitored closely during his stay. He was placed on supplemental O2 and aerosol treatments. He was started on Iv rocephin for possible pneumonia and or empyema. Pulmonary was consulted. On 2/12/18, patient had a right thoracentesis done with 850 ml of fluid obtained with specimen sent to lab.    No new subjective & objective note has been filed under this hospital service since the last note was generated.    Assessment/Plan:      * Pleural effusion on right    Pneumonia/ Possible Empyema/ Atelectasis-  Continue supplemental O2   Pt refusing aerosol treatments  Pulmonology following  Pt for for diagnostic and therapeutic thoracentesis today  Continue IV  antibiotics - Pt currently on Iv Rocephin            Hyperglycemia    Elevated blood glucose levels noted on chemistry  Check accuchecks with correctional SSI  Check HGA1C          Nicotine dependence    Smoking cessation education          Hypokalemia    Monitor  Supplement as needed          Hyponatremia    Monitor  Continue IVF            VTE Risk Mitigation         Ordered     Low Risk of VTE  Once      02/11/18 0134     Place ADRIENNE hose  Until discontinued      02/11/18 0134        JOHNNY Best  Department of Hospital Medicine   [facility]    Time spent seeing patient( greater than 1/2 spent in direct contact) : 26 minutes

## 2018-02-12 NOTE — PROGRESS NOTES
[facility] Hospital Medicine  Progress Note    Patient Name: Zander Boyer  MRN: [id number]  Patient Class: IP- Inpatient   Admission Date: 2/10/2018  Length of Stay: 1 days  Attending Physician: Timo Kirkpatrick MD  Primary Care Provider: Primary Doctor No      Subjective:     Principal Problem:Pleural effusion on right    HPI:  Zander Boyer is a 31 yo male with no significant medical or surgical history.  He is admitted to the service of hospital medicine with a diagnosis of right pleural effusion.  He presented to the ED today per advice from Urgent Care who told him he had fluid on his lung.  His symptoms began 1 1/2 weeks ago with right sided back pain he believed was a pulled muscle.  The pain began to wrap around to his right chest and he began having nonproductive cough and chills with fever with tmax 102F.  The pain is exacerbated by coughing and lying flat. The patient also endorses SOB when lying flat. He states that he experienced mild cold symptoms about 2 wks ago, but that he hasn't been sick otherwise. He denies HA, abdomen pain, n/v and diarrhea. Patient evaluated in ER and found to have significant right pleural effusion and admitted for further evaluation and treatment.      Hospital Course:  The patient was monitored closely during his stay. He was placed on supplemental O2 and aerosol treatments. He was started on Iv rocephin for possible pneumonia and or empyema. Pulmonary was consulted. On 2/12/18, patient had a right thoracentesis done with 850 ml of fluid obtained with specimen sent to lab.    Interval History: Patient for a right thoracentesis today.     Review of Systems   Constitutional: Positive for activity change and fatigue. Negative for appetite change, chills and fever.   HENT: Negative for ear pain, facial swelling, hearing loss, sore throat and trouble swallowing.    Eyes: Negative for photophobia, pain, redness and visual disturbance.    Respiratory: Positive for cough and shortness of breath. Negative for apnea, choking, chest tightness, wheezing and stridor.    Cardiovascular: Positive for chest pain (Right sided ). Negative for palpitations and leg swelling.   Gastrointestinal: Negative for abdominal distention, abdominal pain, blood in stool, diarrhea, nausea and vomiting.   Endocrine: Negative for polydipsia, polyphagia and polyuria.   Genitourinary: Negative for difficulty urinating, dysuria, flank pain, frequency, hematuria and urgency.   Musculoskeletal: Positive for back pain (Right sided). Negative for neck pain and neck stiffness.   Skin: Negative for color change.   Allergic/Immunologic: Negative for food allergies.   Neurological: Negative for seizures, syncope, facial asymmetry, speech difficulty and weakness.   Hematological: Does not bruise/bleed easily.   Psychiatric/Behavioral: Negative for agitation, behavioral problems, confusion and suicidal ideas. The patient is nervous/anxious.      Objective:     Vital Signs (Most Recent):  Temp: 98.6 °F (37 °C) (02/12/18 1209)  Pulse: 98 (02/12/18 1628)  Resp: 18 (02/12/18 1628)  BP: (!) 128/59 (02/12/18 1628)  SpO2: 96 % (02/12/18 1628) Vital Signs (24h Range):  Temp:  [98.6 °F (37 °C)-101.1 °F (38.4 °C)] 98.6 °F (37 °C)  Pulse:  [71-98] 98  Resp:  [16-20] 18  SpO2:  [93 %-96 %] 96 %  BP: (100-135)/(53-89) 128/59     Weight: 105.3 kg (232 lb 3.2 oz)  Body mass index is 28.26 kg/m².    Physical Exam   Constitutional: He is oriented to person, place, and time. He appears well-developed and well-nourished. No distress.   HENT:   Head: Normocephalic and atraumatic.   Eyes: Conjunctivae and EOM are normal. Pupils are equal, round, and reactive to light. Right eye exhibits no discharge. Left eye exhibits no discharge.   Neck: Normal range of motion. Neck supple. No JVD present.   Cardiovascular: Normal rate, regular rhythm, normal heart sounds and intact distal pulses.  Exam reveals no gallop  and no friction rub.    No murmur heard.  Pulses:       Dorsalis pedis pulses are 2+ on the right side, and 2+ on the left side.   Pulmonary/Chest: Effort normal. No accessory muscle usage or stridor. No respiratory distress. He has decreased breath sounds in the right middle field and the right lower field. He has no wheezes. He has no rhonchi. He has no rales.   BBS diminished- Especially on right side   Abdominal: Soft. Bowel sounds are normal. He exhibits no distension. There is no tenderness. There is no guarding.   Genitourinary:   Genitourinary Comments: Not examined   Musculoskeletal: Normal range of motion. He exhibits no edema, tenderness or deformity.   Neurological: He is alert and oriented to person, place, and time. He has normal strength. No cranial nerve deficit.   Skin: Skin is warm, dry and intact. Capillary refill takes less than 2 seconds. He is not diaphoretic.   Psychiatric: He has a normal mood and affect. His speech is normal and behavior is normal. Judgment and thought content normal.   Vitals reviewed.        CRANIAL NERVES     CN III, IV, VI   Pupils are equal, round, and reactive to light.  Extraocular motions are normal.      Labs: Reviewed    Assessment/Plan:      * Pleural effusion on right    Pneumonia/ Possible Empyema/ Atelectasis-  Continue supplemental O2   Pt refusing aerosol treatments  Pulmonology following  Pt for for diagnostic and therapeutic thoracentesis today  Continue IV antibiotics - Pt currently on Iv Rocephin            Nicotine dependence    Smoking cessation education          Hypokalemia    Monitor  Supplement as needed          Hyponatremia    Monitor  Continue IVF            VTE Risk Mitigation         Ordered     Low Risk of VTE  Once      02/11/18 0134     Place ADRIENNE hose  Until discontinued      02/11/18 0134        JOHNNY Best  Department of Hospital Medicine   [facility]    Time spent seeing patient( greater than 1/2 spent in  direct contact) : 26 minutes

## 2018-02-12 NOTE — HOSPITAL COURSE
The patient was monitored closely during his stay. He was placed on supplemental O2 and aerosol treatments. He was started on Iv rocephin for possible pneumonia and or empyema. Pulmonary was consulted. On 2/12/18, patient had a right thoracentesis done with 850 ml of fluid obtained with specimen sent to lab. Serial CXR obtained. Patient cleared for discharge from pulmonary standpoint on oral augmentin. He does not want any further procedures at this time and is ambulating outside to smoke. He was instructed to return to ED for increased dyspnea.

## 2018-02-12 NOTE — PLAN OF CARE
Problem: Pain, Acute (Adult)  Goal: Identify Related Risk Factors and Signs and Symptoms  Related risk factors and signs and symptoms are identified upon initiation of Human Response Clinical Practice Guideline (CPG)   Outcome: Ongoing (interventions implemented as appropriate)  Pt remained free of falls, injuries, or trauma during shift. Fall precautions maintained throughout shift. Pt left floor once in wheelchair with significant other. Notified Dr. Kirkpatrick. Bed kept in lowest position, locked. Call light within reach. Family at bedside entire shift. Fall risk band on. Pt showed no new s/s of skin breakdown during shift. Pt repositions independently and able to address needs/concerns. NPO since midnight. Pt rounded on hourly until midnight, then every 2 hours after midnight. No acute events throughout the shift. VS and assessment performed per orders. Patient progressing towards goals as tolerated. Plan of care reviewed with pt, all concerns addressed. Will continue to monitor

## 2018-02-12 NOTE — NURSING
Pt given PO xanax. Pt informed he can walk around in the hallway but should not walk down to smoke. ~5 minutes later pt noted not to be seen on the floor. Notified Dr. Kirkpatrick in person about the situation.

## 2018-02-12 NOTE — PLAN OF CARE
Cm entered pt's room.  Pt off the unit at this time.  Cm will follow-up.       02/12/18 1122   Discharge Assessment   Assessment Type Discharge Planning Assessment

## 2018-02-12 NOTE — PLAN OF CARE
Problem: Patient Care Overview  Goal: Plan of Care Review  Outcome: Ongoing (interventions implemented as appropriate)  Patient refusing tx due to pain.  Dr. Berkowitz notified.  Tx's placed on prn and 02 at 2lom started.

## 2018-02-12 NOTE — ASSESSMENT & PLAN NOTE
Elevated blood glucose levels noted on chemistry  HGA1C was 4.9- Blood glucose levels stable without need for SSI- Accuchecks with correctional SSI discontinued

## 2018-02-12 NOTE — NURSING
Reviewed labs, consents signed. Right thoracentesis done per . 850 ml's removed. Vital signs stable. Patient tolerated well. bandaid placed to right back, site clean, dry and intact.  Labs collected and sent to lab.    Report called to NEGAR AGUILAR, v/u

## 2018-02-12 NOTE — PLAN OF CARE
Cm completed the assessment with pt and wife- Alexandra.  Pt visited the [facility] and then came over to the hospital.  Pt is self care and lives with family. No PCP, because he just found out he had Medicaid. Pharmacy of choice is [facility].  Pt denies diabetes, dialysis and coumadin.  Insurance verified as Medicaid.  Disposition: Home with family.  No dme needs verbalized at this time.  Pt does need a PCP.       02/12/18 1310   Discharge Assessment   Assessment Type Discharge Planning Assessment   Confirmed/corrected address and phone number on facesheet? Yes   Assessment information obtained from? Patient   Prior to hospitilization cognitive status: Alert/Oriented   Prior to hospitalization functional status: Independent   Current cognitive status: Alert/Oriented   Current Functional Status: Independent   Facility Arrived From: [facility]   Lives With spouse;child(za), dependent   Able to Return to Prior Arrangements yes   Is patient able to care for self after discharge? Yes   Who are your caregiver(s) and their phone number(s)? [place] - [phone]   Patient's perception of discharge disposition home or selfcare   Readmission Within The Last 30 Days no previous admission in last 30 days   Patient currently being followed by outpatient case management? No   Patient currently receives any other outside agency services? No   Equipment Currently Used at Home none   Do you have any problems affording any of your prescribed medications? No  ([employer] please)   Is the patient taking medications as prescribed? (Pt wasn't on any medications)   Does the patient have transportation home? Yes   Transportation Available family or friend will provide   Does the patient receive services at the [facility]? No   Discharge Plan A Home with family   Patient/Family In Agreement With Plan yes

## 2018-02-12 NOTE — PROCEDURES
"Zander Boyer is a 32 y.o. male patient.    Temp: 100.1 °F (37.8 °C) (02/12/18 0300)  Pulse: 80 (02/12/18 0800)  Resp: 18 (02/12/18 0800)  BP: (!) 115/56 (02/12/18 0800)  SpO2: 95 % (02/12/18 0800)  Weight: 105.3 kg (232 lb 3.2 oz) (02/12/18 0557)  Height: 6' 4" (193 cm) (02/11/18 0144)    PICC  Date/Time: 2/12/2018 9:46 AM  Performed by: CHRISTEL HINES  Consent Done: Yes  Time out: Immediately prior to procedure a time out was called to verify the correct patient, procedure, equipment, support staff and site/side marked as required  Indications: med administration and vascular access  Anesthesia: local infiltration  Local anesthetic: lidocaine 1% without epinephrine  Anesthetic Total (mL): 3  Preparation: skin prepped with ChloraPrep  Skin prep agent dried: skin prep agent completely dried prior to procedure  Sterile barriers: all five maximum sterile barriers used - cap, mask, sterile gown, sterile gloves, and large sterile sheet  Hand hygiene: hand hygiene performed prior to central venous catheter insertion  Location details: right basilic  Catheter type: double lumen  Catheter size: 5 Fr  Catheter Length: 40cm    Ultrasound guidance: yes  Vessel Caliber: large and patent, compressibility normal  Needle advanced into vessel with real time Ultrasound guidance.  Guidewire confirmed in vessel.  Image recorded and saved.  Sterile sheath used.  Number of attempts: 1  Post-procedure: blood return through all ports, chlorhexidine patch and sterile dressing applied  Estimated blood loss (mL): 1    Assessment: placement verified by x-ray, no pneumothorax on x-ray, tip termination and successful placement  Tip Termination Explanation: SVC  Complications: none        Christel Hines  2/12/2018  "

## 2018-02-12 NOTE — PLAN OF CARE
Problem: Patient Care Overview  Goal: Plan of Care Review  Outcome: Ongoing (interventions implemented as appropriate)  Pt on room air with 93% sats and Q6 PRN duoneb treatments, no treatment needed at this time

## 2018-02-12 NOTE — SUBJECTIVE & OBJECTIVE
Interval History: Patient for a right thoracentesis today.     Review of Systems   Constitutional: Positive for activity change and fatigue. Negative for appetite change, chills and fever.   HENT: Negative for ear pain, facial swelling, hearing loss, sore throat and trouble swallowing.    Eyes: Negative for photophobia, pain, redness and visual disturbance.   Respiratory: Positive for cough and shortness of breath. Negative for apnea, choking, chest tightness, wheezing and stridor.    Cardiovascular: Positive for chest pain (Right sided ). Negative for palpitations and leg swelling.   Gastrointestinal: Negative for abdominal distention, abdominal pain, blood in stool, diarrhea, nausea and vomiting.   Endocrine: Negative for polydipsia, polyphagia and polyuria.   Genitourinary: Negative for difficulty urinating, dysuria, flank pain, frequency, hematuria and urgency.   Musculoskeletal: Positive for back pain (Right sided). Negative for neck pain and neck stiffness.   Skin: Negative for color change.   Allergic/Immunologic: Negative for food allergies.   Neurological: Negative for seizures, syncope, facial asymmetry, speech difficulty and weakness.   Hematological: Does not bruise/bleed easily.   Psychiatric/Behavioral: Negative for agitation, behavioral problems, confusion and suicidal ideas. The patient is nervous/anxious.      Objective:     Vital Signs (Most Recent):  Temp: 98.6 °F (37 °C) (02/12/18 1209)  Pulse: 98 (02/12/18 1628)  Resp: 18 (02/12/18 1628)  BP: (!) 128/59 (02/12/18 1628)  SpO2: 96 % (02/12/18 1628) Vital Signs (24h Range):  Temp:  [98.6 °F (37 °C)-101.1 °F (38.4 °C)] 98.6 °F (37 °C)  Pulse:  [71-98] 98  Resp:  [16-20] 18  SpO2:  [93 %-96 %] 96 %  BP: (100-135)/(53-89) 128/59     Weight: 105.3 kg (232 lb 3.2 oz)  Body mass index is 28.26 kg/m².    Physical Exam   Constitutional: He is oriented to person, place, and time. He appears well-developed and well-nourished. No distress.   HENT:   Head:  Normocephalic and atraumatic.   Eyes: Conjunctivae and EOM are normal. Pupils are equal, round, and reactive to light. Right eye exhibits no discharge. Left eye exhibits no discharge.   Neck: Normal range of motion. Neck supple. No JVD present.   Cardiovascular: Normal rate, regular rhythm, normal heart sounds and intact distal pulses.  Exam reveals no gallop and no friction rub.    No murmur heard.  Pulses:       Dorsalis pedis pulses are 2+ on the right side, and 2+ on the left side.   Pulmonary/Chest: Effort normal. No accessory muscle usage or stridor. No respiratory distress. He has decreased breath sounds in the right middle field and the right lower field. He has no wheezes. He has no rhonchi. He has no rales.   BBS diminished- Especially on right side   Abdominal: Soft. Bowel sounds are normal. He exhibits no distension. There is no tenderness. There is no guarding.   Genitourinary:   Genitourinary Comments: Not examined   Musculoskeletal: Normal range of motion. He exhibits no edema, tenderness or deformity.   Neurological: He is alert and oriented to person, place, and time. He has normal strength. No cranial nerve deficit.   Skin: Skin is warm, dry and intact. Capillary refill takes less than 2 seconds. He is not diaphoretic.   Psychiatric: He has a normal mood and affect. His speech is normal and behavior is normal. Judgment and thought content normal.   Vitals reviewed.        CRANIAL NERVES     CN III, IV, VI   Pupils are equal, round, and reactive to light.  Extraocular motions are normal.      Labs: Reviewed

## 2018-02-13 ENCOUNTER — OUTSIDE PLACE OF SERVICE (OUTPATIENT)
Dept: PULMONOLOGY | Facility: CLINIC | Age: 33
End: 2018-02-13
Payer: MEDICAID

## 2018-02-13 LAB
ANION GAP SERPL CALC-SCNC: 8 MMOL/L
BASOPHILS # BLD AUTO: 0 K/UL
BASOPHILS NFR BLD: 0.4 %
BODY FLUID SOURCE, LDH: NORMAL
BUN SERPL-MCNC: 10 MG/DL
CALCIUM SERPL-MCNC: 8.8 MG/DL
CHLORIDE SERPL-SCNC: 99 MMOL/L
CO2 SERPL-SCNC: 27 MMOL/L
CREAT SERPL-MCNC: 0.8 MG/DL
DIFFERENTIAL METHOD: ABNORMAL
EOSINOPHIL # BLD AUTO: 0.1 K/UL
EOSINOPHIL NFR BLD: 1 %
ERYTHROCYTE [DISTWIDTH] IN BLOOD BY AUTOMATED COUNT: 13 %
EST. GFR  (AFRICAN AMERICAN): >60 ML/MIN/1.73 M^2
EST. GFR  (NON AFRICAN AMERICAN): >60 ML/MIN/1.73 M^2
ESTIMATED AVG GLUCOSE: 94 MG/DL
GLUCOSE FLD-MCNC: 46 MG/DL
GLUCOSE SERPL-MCNC: 128 MG/DL
HBA1C MFR BLD HPLC: 4.9 %
HCT VFR BLD AUTO: 36.9 %
HGB BLD-MCNC: 12.6 G/DL
LDH FLD L TO P-CCNC: 1013 U/L
LYMPHOCYTES # BLD AUTO: 2.2 K/UL
LYMPHOCYTES NFR BLD: 19.1 %
MCH RBC QN AUTO: 28.8 PG
MCHC RBC AUTO-ENTMCNC: 34 G/DL
MCV RBC AUTO: 85 FL
MONOCYTES # BLD AUTO: 1.1 K/UL
MONOCYTES NFR BLD: 9.3 %
NEUTROPHILS # BLD AUTO: 8 K/UL
NEUTROPHILS NFR BLD: 70.2 %
PLATELET # BLD AUTO: 315 K/UL
PMV BLD AUTO: 7.1 FL
POTASSIUM SERPL-SCNC: 3.7 MMOL/L
PROT FLD-MCNC: 5 G/DL
RBC # BLD AUTO: 4.36 M/UL
SODIUM SERPL-SCNC: 134 MMOL/L
SPECIMEN SOURCE: NORMAL
SPECIMEN SOURCE: NORMAL
WBC # BLD AUTO: 11.3 K/UL

## 2018-02-13 PROCEDURE — 25000003 PHARM REV CODE 250: Performed by: NURSE PRACTITIONER

## 2018-02-13 PROCEDURE — 11000001 HC ACUTE MED/SURG PRIVATE ROOM

## 2018-02-13 PROCEDURE — A4216 STERILE WATER/SALINE, 10 ML: HCPCS | Performed by: HOSPITALIST

## 2018-02-13 PROCEDURE — 94640 AIRWAY INHALATION TREATMENT: CPT

## 2018-02-13 PROCEDURE — 80048 BASIC METABOLIC PNL TOTAL CA: CPT

## 2018-02-13 PROCEDURE — 63600175 PHARM REV CODE 636 W HCPCS: Performed by: NURSE PRACTITIONER

## 2018-02-13 PROCEDURE — 25000003 PHARM REV CODE 250: Performed by: HOSPITALIST

## 2018-02-13 PROCEDURE — 25000242 PHARM REV CODE 250 ALT 637 W/ HCPCS: Performed by: INTERNAL MEDICINE

## 2018-02-13 PROCEDURE — 99900035 HC TECH TIME PER 15 MIN (STAT)

## 2018-02-13 PROCEDURE — 83036 HEMOGLOBIN GLYCOSYLATED A1C: CPT

## 2018-02-13 PROCEDURE — 85025 COMPLETE CBC W/AUTO DIFF WBC: CPT

## 2018-02-13 PROCEDURE — 63600175 PHARM REV CODE 636 W HCPCS: Performed by: HOSPITALIST

## 2018-02-13 PROCEDURE — 99232 SBSQ HOSP IP/OBS MODERATE 35: CPT | Mod: ,,, | Performed by: INTERNAL MEDICINE

## 2018-02-13 PROCEDURE — 94761 N-INVAS EAR/PLS OXIMETRY MLT: CPT

## 2018-02-13 RX ORDER — HYDROMORPHONE HYDROCHLORIDE 2 MG/ML
2 INJECTION, SOLUTION INTRAMUSCULAR; INTRAVENOUS; SUBCUTANEOUS EVERY 4 HOURS PRN
Status: DISCONTINUED | OUTPATIENT
Start: 2018-02-13 | End: 2018-02-14 | Stop reason: HOSPADM

## 2018-02-13 RX ADMIN — HYDROCODONE POLISTIREX AND CHLORPHENIRAMINE POLISITREX 5 ML: 10; 8 SUSPENSION, EXTENDED RELEASE ORAL at 04:02

## 2018-02-13 RX ADMIN — HYDROMORPHONE HYDROCHLORIDE 2 MG: 2 INJECTION, SOLUTION INTRAMUSCULAR; INTRAVENOUS; SUBCUTANEOUS at 03:02

## 2018-02-13 RX ADMIN — OXYCODONE HYDROCHLORIDE AND ACETAMINOPHEN 1 TABLET: 10; 325 TABLET ORAL at 12:02

## 2018-02-13 RX ADMIN — SODIUM CHLORIDE, PRESERVATIVE FREE 10 ML: 5 INJECTION INTRAVENOUS at 04:02

## 2018-02-13 RX ADMIN — SODIUM CHLORIDE, PRESERVATIVE FREE 10 ML: 5 INJECTION INTRAVENOUS at 12:02

## 2018-02-13 RX ADMIN — HYDROMORPHONE HYDROCHLORIDE 2 MG: 2 INJECTION, SOLUTION INTRAMUSCULAR; INTRAVENOUS; SUBCUTANEOUS at 12:02

## 2018-02-13 RX ADMIN — IPRATROPIUM BROMIDE AND ALBUTEROL SULFATE 3 ML: .5; 3 SOLUTION RESPIRATORY (INHALATION) at 07:02

## 2018-02-13 RX ADMIN — IPRATROPIUM BROMIDE AND ALBUTEROL SULFATE 3 ML: .5; 3 SOLUTION RESPIRATORY (INHALATION) at 04:02

## 2018-02-13 RX ADMIN — PANTOPRAZOLE SODIUM 40 MG: 40 TABLET, DELAYED RELEASE ORAL at 08:02

## 2018-02-13 RX ADMIN — SODIUM CHLORIDE, PRESERVATIVE FREE 10 ML: 5 INJECTION INTRAVENOUS at 06:02

## 2018-02-13 RX ADMIN — THERA TABS 1 TABLET: TAB at 12:02

## 2018-02-13 RX ADMIN — HYDROMORPHONE HYDROCHLORIDE 2 MG: 2 INJECTION, SOLUTION INTRAMUSCULAR; INTRAVENOUS; SUBCUTANEOUS at 08:02

## 2018-02-13 RX ADMIN — STANDARDIZED SENNA CONCENTRATE AND DOCUSATE SODIUM 1 TABLET: 8.6; 5 TABLET, FILM COATED ORAL at 08:02

## 2018-02-13 RX ADMIN — CEFTRIAXONE 2 G: 2 INJECTION, SOLUTION INTRAVENOUS at 01:02

## 2018-02-13 NOTE — PLAN OF CARE
Problem: Patient Care Overview  Goal: Plan of Care Review  Outcome: Ongoing (interventions implemented as appropriate)  Pt rec neb treatments with duoneb. BS diminished oh with HR 88 bpm and 95% sats on room air.

## 2018-02-13 NOTE — PROGRESS NOTES
Progress Note  PULMONARY    Admit Date: 2/10/2018   02/12/2018      SUBJECTIVE:     Feb 12, 2018 - avoids resp rx for fear of coughing, has h/o chr wheezes attributed to smoking.      PFSH and Allergies reviewed.    OBJECTIVE:     Vitals (Most recent):  Vitals:    02/12/18 1628   BP: (!) 128/59   Pulse: 98   Resp: 18   Temp:        Vitals (24 hour range):  Temp:  [98.6 °F (37 °C)-101.1 °F (38.4 °C)]   Pulse:  [71-98]   Resp:  [16-20]   BP: (100-135)/(53-89)   SpO2:  [93 %-96 %]       Intake/Output Summary (Last 24 hours) at 02/12/18 1845  Last data filed at 02/12/18 0500   Gross per 24 hour   Intake              810 ml   Output                0 ml   Net              810 ml          Physical Exam:  The patient's neuro status (alertness,orientation,cognitive function,motor skills,), pharyngeal exam (oral lesions, hygiene, abn dentition,), Neck (jvd,mass,thyroid,nodes in neck and above/below clavicle),RESPIRATORY(symmetry,effort,fremitus,percussion,auscultation),  Cor(rhythm,heart tones including gallops,perfusion,edema)ABD(distention,hepatic&splenomegaly,tenderness,masses), Skin(rash,cyanosis),Psyc(affect,judgement,).  Exam negative except for these pertinent findings:    Robust, dull with decreased fremitus and ecursion rll, decreased bs. No edema.    Radiographs reviewed: view by direct vision  Sl improved right process - lingering problem.  Results for orders placed during the hospital encounter of 02/10/18   X-Ray Chest 1 View for PICC_Central line    Narrative AP chest compared to 2/11/18    FINDINGS: A right upper extremity PICC line has been placed and has its tip in the superior vena cava. There is a moderate right pleural effusion which is unchanged. Superimposed right basilar atelectasis or consolidation may also be present. The left lung is clear. The heart size is normal.    Impression Appropriately positioned right PICC line.  Moderate right pleural effusion.      Electronically signed by: Alejandro  Fred NEIL  Date:     02/12/18  Time:    10:10    ]    Labs       Recent Labs  Lab 02/12/18  1018   WBC 11.80   HGB 13.5*   HCT 40.0        Recent Labs  Lab 02/12/18  1018   *   K 3.8      CO2 26   BUN 8   CREATININE 0.8   *   CALCIUM 9.1   MG 1.9   INR 1.1   No results for input(s): PH, PCO2, PO2, HCO3 in the last 24 hours.  Microbiology Results (last 7 days)     Procedure Component Value Units Date/Time    Culture, Body Fluid (Aerobic) w/ GS [[id number]] Collected:  02/12/18 1621    Order Status:  Sent Specimen:  Body Fluid from Pleural Fluid Updated:  02/12/18 1622    AFB Culture & Smear [[id number]] Collected:  02/12/18 1621    Order Status:  Sent Specimen:  Body Fluid from Pleural Fluid Updated:  02/12/18 1622    Fungus culture [[id number]] Collected:  02/12/18 1621    Order Status:  Sent Specimen:  Body Fluid from Pleural Fluid Updated:  02/12/18 1622    Culture, Respiratory [[id number]] Collected:  02/11/18 0315    Order Status:  Completed Specimen:  Respiratory from Sputum, Expectorated Updated:  02/12/18 0827     Respiratory Culture Normal respiratory marina     Gram Stain (Respiratory) >10 epithelial cells per low power field     Gram Stain (Respiratory) Many WBC's     Gram Stain (Respiratory) Many Gram positive rods     Gram Stain (Respiratory) Few Gram positive cocci     Gram Stain (Respiratory) Rare budding yeast          Impression:  Active Hospital Problems    Diagnosis  POA    *Pleural effusion on right [J90]  Yes     Large partially posterior laterally and inferiorly loculated right-sided pleural effusion with compressive atelectasis      Nicotine dependence [F17.200]  Yes    Pneumonia [J18.9]  Yes    Empyema [J86.9]  Yes     Possible      Hyperglycemia [R73.9]  Yes    Hyponatremia [E87.1]  Yes    Hypokalemia [E87.6]  Yes    Atelectasis [J98.11]  Yes      Resolved Hospital Problems    Diagnosis Date Resolved POA   No resolved problems to display.               Plan:      Feb 12, wbc 16.3 - 11.3, still lots of volume loss and trapped lung on cxr.  Would rec scheduled neb rx. Pt knows may need surgery. Re evaluate in 2 days.                                    .

## 2018-02-13 NOTE — NURSING
Went to give pain medication and pt was still off the floor. Pt has been off the floor for about an hour.

## 2018-02-13 NOTE — SUBJECTIVE & OBJECTIVE
Interval History: Patient S/p  right thoracentesis yesterday with 850 ml fluid obtained. Fluid for culture, AFB, and fungus still pending.    Review of Systems   Constitutional: Positive for activity change and fatigue. Negative for appetite change, chills and fever.   HENT: Negative for ear pain, facial swelling, hearing loss, sore throat and trouble swallowing.    Eyes: Negative for photophobia, pain, redness and visual disturbance.   Respiratory: Positive for cough and shortness of breath. Negative for apnea, choking, chest tightness, wheezing and stridor.    Cardiovascular: Positive for chest pain (Right sided ). Negative for palpitations and leg swelling.   Gastrointestinal: Negative for abdominal distention, abdominal pain, blood in stool, diarrhea, nausea and vomiting.   Endocrine: Negative for polydipsia, polyphagia and polyuria.   Genitourinary: Negative for difficulty urinating, dysuria, flank pain, frequency, hematuria and urgency.   Musculoskeletal: Positive for back pain (Right sided). Negative for neck pain and neck stiffness.   Skin: Negative for color change.   Allergic/Immunologic: Negative for food allergies.   Neurological: Negative for seizures, syncope, facial asymmetry, speech difficulty and weakness.   Hematological: Does not bruise/bleed easily.   Psychiatric/Behavioral: Negative for agitation, behavioral problems, confusion and suicidal ideas. The patient is not nervous/anxious.      Objective:     Vital Signs (Most Recent):  Temp: 98.5 °F (36.9 °C) (02/13/18 0816)  Pulse: 88 (02/13/18 0816)  Resp: 18 (02/13/18 0816)  BP: (!) 109/53 (02/13/18 0816)  SpO2: (!) 94 % (02/13/18 0816) Vital Signs (24h Range):  Temp:  [98.1 °F (36.7 °C)-100.8 °F (38.2 °C)] 98.5 °F (36.9 °C)  Pulse:  [75-98] 88  Resp:  [16-20] 18  SpO2:  [93 %-96 %] 94 %  BP: (100-128)/(53-59) 109/53     Weight: 105.3 kg (232 lb 3.2 oz)  Body mass index is 28.26 kg/m².    Physical Exam   Constitutional: He is oriented to person,  place, and time. He appears well-developed and well-nourished. No distress.   HENT:   Head: Normocephalic and atraumatic.   Eyes: Conjunctivae and EOM are normal. Pupils are equal, round, and reactive to light. Right eye exhibits no discharge. Left eye exhibits no discharge.   Neck: Normal range of motion. Neck supple. No JVD present.   Cardiovascular: Normal rate, regular rhythm, normal heart sounds and intact distal pulses.  Exam reveals no gallop and no friction rub.    No murmur heard.  Pulses:       Dorsalis pedis pulses are 2+ on the right side, and 2+ on the left side.   Pulmonary/Chest: Effort normal. No accessory muscle usage or stridor. No respiratory distress. He has decreased breath sounds in the right middle field and the right lower field. He has no wheezes. He has no rhonchi. He has no rales.   BBS diminished- Especially on right side   Abdominal: Soft. Bowel sounds are normal. He exhibits no distension. There is no tenderness. There is no guarding.   Genitourinary:   Genitourinary Comments: Not examined   Musculoskeletal: Normal range of motion. He exhibits no edema, tenderness or deformity.   Neurological: He is alert and oriented to person, place, and time. He has normal strength. No cranial nerve deficit.   Skin: Skin is warm, dry and intact. Capillary refill takes less than 2 seconds. He is not diaphoretic.   Psychiatric: He has a normal mood and affect. His speech is normal and behavior is normal. Judgment and thought content normal.   Vitals reviewed.        CRANIAL NERVES     CN III, IV, VI   Pupils are equal, round, and reactive to light.  Extraocular motions are normal.      Labs: Reviewed

## 2018-02-13 NOTE — PROGRESS NOTES
[facility] Hospital Medicine  Progress Note    Patient Name: Zander Boyer  MRN: [id number]  Patient Class: IP- Inpatient   Admission Date: 2/10/2018  Length of Stay: 2 days  Attending Physician: Timo Kirkpatrick MD  Primary Care Provider: Primary Doctor No      Subjective:     Principal Problem:Pleural effusion on right    HPI:  Zander Boyer is a 33 yo male with no significant medical or surgical history.  He is admitted to the service of hospital medicine with a diagnosis of right pleural effusion.  He presented to the ED today per advice from Urgent Care who told him he had fluid on his lung.  His symptoms began 1 1/2 weeks ago with right sided back pain he believed was a pulled muscle.  The pain began to wrap around to his right chest and he began having nonproductive cough and chills with fever with tmax 102F.  The pain is exacerbated by coughing and lying flat. The patient also endorses SOB when lying flat. He states that he experienced mild cold symptoms about 2 wks ago, but that he hasn't been sick otherwise. He denies HA, abdomen pain, n/v and diarrhea. Patient evaluated in ER and found to have significant right pleural effusion and admitted for further evaluation and treatment.      Hospital Course:  The patient was monitored closely during his stay. He was placed on supplemental O2 and aerosol treatments. He was started on Iv rocephin for possible pneumonia and or empyema. Pulmonary was consulted. On 2/12/18, patient had a right thoracentesis done with 850 ml of fluid obtained with specimen sent to lab.    Interval History: Patient S/p  right thoracentesis yesterday with 850 ml fluid obtained. Fluid for culture, AFB, and fungus still pending.    Review of Systems   Constitutional: Positive for activity change and fatigue. Negative for appetite change, chills and fever.   HENT: Negative for ear pain, facial swelling, hearing loss, sore throat and trouble swallowing.     Eyes: Negative for photophobia, pain, redness and visual disturbance.   Respiratory: Positive for cough and shortness of breath. Negative for apnea, choking, chest tightness, wheezing and stridor.    Cardiovascular: Positive for chest pain (Right sided ). Negative for palpitations and leg swelling.   Gastrointestinal: Negative for abdominal distention, abdominal pain, blood in stool, diarrhea, nausea and vomiting.   Endocrine: Negative for polydipsia, polyphagia and polyuria.   Genitourinary: Negative for difficulty urinating, dysuria, flank pain, frequency, hematuria and urgency.   Musculoskeletal: Positive for back pain (Right sided). Negative for neck pain and neck stiffness.   Skin: Negative for color change.   Allergic/Immunologic: Negative for food allergies.   Neurological: Negative for seizures, syncope, facial asymmetry, speech difficulty and weakness.   Hematological: Does not bruise/bleed easily.   Psychiatric/Behavioral: Negative for agitation, behavioral problems, confusion and suicidal ideas. The patient is not nervous/anxious.      Objective:     Vital Signs (Most Recent):  Temp: 98.5 °F (36.9 °C) (02/13/18 0816)  Pulse: 88 (02/13/18 0816)  Resp: 18 (02/13/18 0816)  BP: (!) 109/53 (02/13/18 0816)  SpO2: (!) 94 % (02/13/18 0816) Vital Signs (24h Range):  Temp:  [98.1 °F (36.7 °C)-100.8 °F (38.2 °C)] 98.5 °F (36.9 °C)  Pulse:  [75-98] 88  Resp:  [16-20] 18  SpO2:  [93 %-96 %] 94 %  BP: (100-128)/(53-59) 109/53     Weight: 105.3 kg (232 lb 3.2 oz)  Body mass index is 28.26 kg/m².    Physical Exam   Constitutional: He is oriented to person, place, and time. He appears well-developed and well-nourished. No distress.   HENT:   Head: Normocephalic and atraumatic.   Eyes: Conjunctivae and EOM are normal. Pupils are equal, round, and reactive to light. Right eye exhibits no discharge. Left eye exhibits no discharge.   Neck: Normal range of motion. Neck supple. No JVD present.   Cardiovascular: Normal rate,  regular rhythm, normal heart sounds and intact distal pulses.  Exam reveals no gallop and no friction rub.    No murmur heard.  Pulses:       Dorsalis pedis pulses are 2+ on the right side, and 2+ on the left side.   Pulmonary/Chest: Effort normal. No accessory muscle usage or stridor. No respiratory distress. He has decreased breath sounds in the right middle field and the right lower field. He has no wheezes. He has no rhonchi. He has no rales.   BBS diminished- Especially on right side   Abdominal: Soft. Bowel sounds are normal. He exhibits no distension. There is no tenderness. There is no guarding.   Genitourinary:   Genitourinary Comments: Not examined   Musculoskeletal: Normal range of motion. He exhibits no edema, tenderness or deformity.   Neurological: He is alert and oriented to person, place, and time. He has normal strength. No cranial nerve deficit.   Skin: Skin is warm, dry and intact. Capillary refill takes less than 2 seconds. He is not diaphoretic.   Psychiatric: He has a normal mood and affect. His speech is normal and behavior is normal. Judgment and thought content normal.   Vitals reviewed.        CRANIAL NERVES     CN III, IV, VI   Pupils are equal, round, and reactive to light.  Extraocular motions are normal.      Labs: Reviewed          Assessment/Plan:      * Pleural effusion on right    Pneumonia/ Possible Empyema/ Atelectasis/ S/p U/s guided right thoracentesis 2/12/18-  Pt S/p right thoracentesis 2/12/18 with 850 ml fluid obtained  Continue supplemental O2   Pulmonology following  Continue IV antibiotics - Pt currently on Iv Rocephin    Fluid for AFB, Fungus, and c/s pending          Hyperglycemia    Elevated blood glucose levels noted on chemistry  HGA1C was 4.9- Blood glucose levels stable without need for SSI- Accuchecks with correctional SSI discontinued          Nicotine dependence    Smoking cessation education  Pt refused nicotine patch at this time        Hypokalemia     Monitor  Supplement as needed          Hyponatremia    Stable            VTE Risk Mitigation         Ordered     Low Risk of VTE  Once      02/11/18 0134     Place ADRIENNE hose  Until discontinued      02/11/18 0134        JOHNNY Best  Department of Hospital Medicine   [facility]    Time spent seeing patient( greater than 1/2 spent in direct contact) :  29 minutes

## 2018-02-13 NOTE — ASSESSMENT & PLAN NOTE
Pneumonia/ Possible Empyema/ Atelectasis/ S/p U/s guided right thoracentesis 2/12/18-  Pt S/p right thoracentesis 2/12/18 with 850 ml fluid obtained  Continue supplemental O2   Pulmonology following  Continue IV antibiotics - Pt currently on Iv Rocephin    Fluid for AFB, Fungus, and c/s pending

## 2018-02-13 NOTE — PLAN OF CARE
Problem: Patient Care Overview  Goal: Plan of Care Review  Outcome: Ongoing (interventions implemented as appropriate)  Pt ambulating on and off unit with a steady gait. Pt pain managed with IV and PO analgesics. Pt with good appetite. PICC line to right upper arm dressing dry and intact. Limb alert to right arm in place. Call light in easy reach.

## 2018-02-13 NOTE — PLAN OF CARE
Problem: Patient Care Overview  Goal: Individualization & Mutuality  Outcome: Ongoing (interventions implemented as appropriate)  Pt remained free of falls, injuries, or trauma during shift. Fall precautions maintained throughout shift. Bed kept in lowest position, locked. Call light within reach. Fall risk band onPt showed no new s/s of skin breakdown during shift. Pt able to ambulate freely and call for any needs/wants. Pt rounded on hourly until midnight, then every 2 hours after midnight. Pt was off the floor for a couple of hours in the beginning of the shift. Pt had very good pain relief today and seems to be breathing better since his thoracentesis. No acute events throughout the shift. VS and assessment performed per orders. Patient progressing towards goals as tolerated. Plan of care reviewed with pt, all concerns addressed. Will continue to monitor

## 2018-02-13 NOTE — PLAN OF CARE
Problem: Patient Care Overview  Goal: Plan of Care Review  Outcome: Ongoing (interventions implemented as appropriate)  PT AAO X4. PT able to verbalize needs/want. Plan of care reviewed with patient at the beginning of the shift. Patient verbalized understanding.  Patient ambulates independently. Hourly rounds completed on this patient throughout this shift. VS stable. Pain monitored and controlled with PRN pain medication. Patient has remained free from fall/injury. Side rails up X2, bed in lowest, locked position, needs attended to ,  call light within reach will continue to monitor.

## 2018-02-14 VITALS
TEMPERATURE: 99 F | WEIGHT: 232.19 LBS | BODY MASS INDEX: 28.27 KG/M2 | HEART RATE: 68 BPM | OXYGEN SATURATION: 95 % | SYSTOLIC BLOOD PRESSURE: 121 MMHG | HEIGHT: 76 IN | RESPIRATION RATE: 20 BRPM | DIASTOLIC BLOOD PRESSURE: 57 MMHG

## 2018-02-14 LAB
BACTERIA SPEC AEROBE CULT: NORMAL
CRP SERPL-MCNC: 156.6 MG/L
GRAM STN SPEC: NORMAL
PROCALCITONIN SERPL IA-MCNC: 0.13 NG/ML

## 2018-02-14 PROCEDURE — A4216 STERILE WATER/SALINE, 10 ML: HCPCS | Performed by: HOSPITALIST

## 2018-02-14 PROCEDURE — 25000003 PHARM REV CODE 250: Performed by: HOSPITALIST

## 2018-02-14 PROCEDURE — 86140 C-REACTIVE PROTEIN: CPT

## 2018-02-14 PROCEDURE — 25000003 PHARM REV CODE 250: Performed by: NURSE PRACTITIONER

## 2018-02-14 PROCEDURE — 84145 PROCALCITONIN (PCT): CPT

## 2018-02-14 PROCEDURE — 99900035 HC TECH TIME PER 15 MIN (STAT)

## 2018-02-14 PROCEDURE — 63600175 PHARM REV CODE 636 W HCPCS: Performed by: NURSE PRACTITIONER

## 2018-02-14 PROCEDURE — 36415 COLL VENOUS BLD VENIPUNCTURE: CPT

## 2018-02-14 PROCEDURE — 63600175 PHARM REV CODE 636 W HCPCS: Performed by: HOSPITALIST

## 2018-02-14 PROCEDURE — 99232 SBSQ HOSP IP/OBS MODERATE 35: CPT | Mod: ,,, | Performed by: INTERNAL MEDICINE

## 2018-02-14 RX ORDER — AMOXICILLIN AND CLAVULANATE POTASSIUM 500; 125 MG/1; MG/1
1 TABLET, FILM COATED ORAL EVERY 8 HOURS
Qty: 42 TABLET | Refills: 1 | Status: SHIPPED | OUTPATIENT
Start: 2018-02-14

## 2018-02-14 RX ADMIN — OXYCODONE HYDROCHLORIDE AND ACETAMINOPHEN 1 TABLET: 10; 325 TABLET ORAL at 08:02

## 2018-02-14 RX ADMIN — HYDROMORPHONE HYDROCHLORIDE 2 MG: 2 INJECTION, SOLUTION INTRAMUSCULAR; INTRAVENOUS; SUBCUTANEOUS at 09:02

## 2018-02-14 RX ADMIN — PANTOPRAZOLE SODIUM 40 MG: 40 TABLET, DELAYED RELEASE ORAL at 08:02

## 2018-02-14 RX ADMIN — SODIUM CHLORIDE, PRESERVATIVE FREE 10 ML: 5 INJECTION INTRAVENOUS at 12:02

## 2018-02-14 RX ADMIN — STANDARDIZED SENNA CONCENTRATE AND DOCUSATE SODIUM 1 TABLET: 8.6; 5 TABLET, FILM COATED ORAL at 08:02

## 2018-02-14 RX ADMIN — THERA TABS 1 TABLET: TAB at 08:02

## 2018-02-14 RX ADMIN — CEFTRIAXONE 2 G: 2 INJECTION, SOLUTION INTRAVENOUS at 12:02

## 2018-02-14 RX ADMIN — HYDROMORPHONE HYDROCHLORIDE 2 MG: 2 INJECTION, SOLUTION INTRAMUSCULAR; INTRAVENOUS; SUBCUTANEOUS at 05:02

## 2018-02-14 RX ADMIN — HYDROMORPHONE HYDROCHLORIDE 2 MG: 2 INJECTION, SOLUTION INTRAMUSCULAR; INTRAVENOUS; SUBCUTANEOUS at 03:02

## 2018-02-14 RX ADMIN — SODIUM CHLORIDE, PRESERVATIVE FREE 10 ML: 5 INJECTION INTRAVENOUS at 05:02

## 2018-02-14 NOTE — ASSESSMENT & PLAN NOTE
Pneumonia/ Possible Empyema/ Atelectasis/ S/p U/s guided right thoracentesis 2/12/18-  Pt S/p right thoracentesis 2/12/18 with 850 ml fluid obtained  Continue supplemental O2   Pulmonology following  Continue IV antibiotics - Pt currently on Iv Rocephin    Fluid for AFB, Fungus pending. c/s negative.

## 2018-02-14 NOTE — PLAN OF CARE
Problem: Patient Care Overview  Goal: Plan of Care Review  Problem: Patient Care Overview  Goal: Plan of Care Review  Outcome: Ongoing (interventions implemented as appropriate)  Plan of care reviewed with patient at start of shift. Patient verbalized understanding, but agreed to voice any questions or concerns as needed. Reviewed fall precautions and encouraged patient to wear non slip socks and call for assistance as needed. Prn pain medication given for complaint of right chest discomfort with effective results. Bed low and locked with call light and personal items in reach. Family members at bedside. Will continue to monitor.

## 2018-02-14 NOTE — PROGRESS NOTES
"Progress Note  Pulmonary/Critical Care      Admit Date: 2/10/2018    SUBJECTIVE:     HPI/Interval history (See H&P for complete P,F,SHx) :     2/13/2018 - Feels much better, much less pleuritic pain.    Review of Systems: List if applicable    Pain scale: 2/10    Review of Systems   Respiratory: Positive for cough.    Cardiovascular: Positive for chest pain.   All other systems reviewed and are negative.      OBJECTIVE:     Vital Signs Range (Last 24H):  Temp:  [98.1 °F (36.7 °C)-100.8 °F (38.2 °C)]   Pulse:  [76-89]   Resp:  [14-20]   BP: (107-114)/(53-59)   SpO2:  [92 %-95 %]     I & O (Last 24H):    Intake/Output Summary (Last 24 hours) at 02/13/18 1815  Last data filed at 02/13/18 1231   Gross per 24 hour   Intake             1400 ml   Output                0 ml   Net             1400 ml       Estimated body mass index is 28.26 kg/m² as calculated from the following:    Height as of this encounter: 6' 4" (1.93 m).    Weight as of this encounter: 105.3 kg (232 lb 3.2 oz).    Vent Settings-      ABG  No results for input(s): PH, PO2, PCO2, HCO3, BE in the last 168 hours.    Physical Exam:  Physical Exam   Constitutional: He is well-developed, well-nourished, and in no distress. No distress.   HENT:   Head: Normocephalic and atraumatic.   Eyes: Conjunctivae and EOM are normal. Pupils are equal, round, and reactive to light.   Neck: Normal range of motion. Neck supple. No JVD present. No tracheal deviation present. No thyromegaly present.   Cardiovascular: Normal rate, regular rhythm and intact distal pulses.  Exam reveals no gallop and no friction rub.    No murmur heard.  Pulmonary/Chest: Effort normal. No stridor. No respiratory distress. He has no wheezes. He has no rales.   bs decreased at right base   Skin: He is not diaphoretic.   Vitals reviewed.      Laboratory/Diagnostic Data:    Recent Results (from the past 336 hour(s))   CBC auto differential    Collection Time: 02/13/18  5:07 AM   Result Value Ref " Range    WBC 11.30 3.90 - 12.70 K/uL    Hemoglobin 12.6 (L) 14.0 - 18.0 g/dL    Hematocrit 36.9 (L) 40.0 - 54.0 %    Platelets 315 150 - 350 K/uL   CBC auto differential    Collection Time: 02/12/18 10:18 AM   Result Value Ref Range    WBC 11.80 3.90 - 12.70 K/uL    Hemoglobin 13.5 (L) 14.0 - 18.0 g/dL    Hematocrit 40.0 40.0 - 54.0 %    Platelets 328 150 - 350 K/uL   CBC auto differential    Collection Time: 02/11/18 12:01 AM   Result Value Ref Range    WBC 16.30 (H) 3.90 - 12.70 K/uL    Hemoglobin 13.4 (L) 14.0 - 18.0 g/dL    Hematocrit 40.1 40.0 - 54.0 %    Platelets 270 150 - 350 K/uL       Recent Results (from the past 336 hour(s))   Basic metabolic panel    Collection Time: 02/13/18  5:07 AM   Result Value Ref Range    Sodium 134 (L) 136 - 145 mmol/L    Potassium 3.7 3.5 - 5.1 mmol/L    Chloride 99 95 - 110 mmol/L    CO2 27 23 - 29 mmol/L    BUN, Bld 10 6 - 20 mg/dL    Creatinine 0.8 0.5 - 1.4 mg/dL    Calcium 8.8 8.7 - 10.5 mg/dL    Anion Gap 8 8 - 16 mmol/L   Basic metabolic panel    Collection Time: 02/12/18 10:18 AM   Result Value Ref Range    Sodium 135 (L) 136 - 145 mmol/L    Potassium 3.8 3.5 - 5.1 mmol/L    Chloride 102 95 - 110 mmol/L    CO2 26 23 - 29 mmol/L    BUN, Bld 8 6 - 20 mg/dL    Creatinine 0.8 0.5 - 1.4 mg/dL    Calcium 9.1 8.7 - 10.5 mg/dL    Anion Gap 7 (L) 8 - 16 mmol/L       Lab Results   Component Value Date    ALT 28 02/11/2018    AST 14 02/11/2018    ALKPHOS 65 02/11/2018    BILITOT 0.8 02/11/2018       No results for input(s): PT in the last 24 hours.    Invalid input(s):  INR,  APTT    Microbiology    Microbiology Results (last 7 days)     Procedure Component Value Units Date/Time    Culture, Body Fluid (Aerobic) w/ GS [[id number]] Collected:  02/12/18 1621    Order Status:  Completed Specimen:  Body Fluid from Pleural Fluid Updated:  02/13/18 0317     Gram Stain Result No WBC's      No organisms seen    AFB Culture & Smear [[id number]] Collected:  02/12/18 1621    Order Status:  Sent  Specimen:  Body Fluid from Pleural Fluid Updated:  02/13/18 0056    Fungus culture [[id number]] Collected:  02/12/18 1621    Order Status:  Sent Specimen:  Body Fluid from Pleural Fluid Updated:  02/13/18 0056    Culture, Respiratory [[id number]] Collected:  02/11/18 0315    Order Status:  Completed Specimen:  Respiratory from Sputum, Expectorated Updated:  02/12/18 0827     Respiratory Culture Normal respiratory marina     Gram Stain (Respiratory) >10 epithelial cells per low power field     Gram Stain (Respiratory) Many WBC's     Gram Stain (Respiratory) Many Gram positive rods     Gram Stain (Respiratory) Few Gram positive cocci     Gram Stain (Respiratory) Rare budding yeast          Radiology    Imaging Results          X-Ray Chest PA And Lateral (Final result)  Result time 02/13/18 18:02:37    Final result by Bony Weaver DO (02/13/18 18:02:37)                 Impression:        1.  As above      Electronically signed by: BONY WEAVER D.O.  Date:     02/13/18  Time:    18:02              Narrative:    2 view chest    Comparison: 02/12/2018    Findings: Prominent pleural-parenchymal consolidation in the region of the right lung base again noted.  The effusion on the right appears to be slightly larger as there is some increased pleural thickening along the lateral aspect of the right hemithorax.  The heart is not enlarged.A right sided PICC line catheter is noted with the tip overlying the mid SVC.                             X-Ray Chest AP Portable (Final result)  Result time 02/13/18 09:47:53    Final result by Ezequiel Kim MD (02/13/18 09:47:53)                 Impression:         1. No detectable pneumothorax status post thoracentesis with moderate residual pleural effusion and associated atelectasis or consolidation.      Electronically signed by: Dr. Ezequiel Kim  Date:     02/13/18  Time:    09:47              Narrative:    EXAM: Portable AP erect chest x-ray at 1923 hrs.    INDICATION:  Status post thoracentesis    COMPARISON: Chest x-ray dated 02/12/2018 at 1637 hrs.      FINDINGS:     There is persistent pleural and parenchymal opacity in the right lower lung consistent with known residual effusion and associated atelectasis or consolidation.  There is no pneumothorax. This has not significantly changed compared to the chest x-ray performed at 1637 hrs. but there is decrease in the amount pleural fluid compared to study performed earlier at 0955 hrs. A right-sided PICC line is unchanged.  The left lung is clear.                             X-Ray Chest AP Portable (Final result)  Result time 02/12/18 16:42:56    Final result by Elias Holguin Jr., MD (02/12/18 16:42:56)                 Impression:     A pneumothorax is not seen directly postthoracentesis. There remains a small to moderate right pleural effusion probably loculated. Continued atelectasis infiltrate at the right lung base is noted.      Electronically signed by: Elias Holguin MD  Date:     02/12/18  Time:    16:42              Narrative:    Chest x-ray is performed following the thoracentesis. A PICC remains in place on the right ending in the superior vena cava. There is a significant decrease but not complete resolution of the right pleural effusion. Residual atelectasis is noted at the right lung base. A pneumothorax is not identified.                             US Guided Thoracentesis (Final result)  Result time 02/12/18 16:34:48    Final result by Elias Holguin Jr., MD (02/12/18 16:34:48)                 Impression:     850 cc of mildly turbid straw-colored pleural effusion drained from the right chest with ultrasound directed thoracentesis. Post procedure chest x-rays pending.      Electronically signed by: Elias Holguin MD  Date:     02/12/18  Time:    16:34              Narrative:    History is large right pleural effusion. Following informed consent,, time out,  local prep and local anesthesia a thoracentesis  needle and catheter were advanced into the right pleural space following localization by ultrasound.    40 cc sample of turbid straw-colored pleural effusion was drained and sent to the laboratory in multiple tubes for analysis and cultures. The catheter was hooked to wall suction and an additional 810 cc of pleural effusion were drained until the flow stopped. The catheter was withdrawn and pressure held for short time. A Band-Aid was placed. Total blood loss was less than 2 cc. Post procedure chest x-rays were ordered.                             X-Ray Chest 1 View for PICC_Central line (Final result)  Result time 02/12/18 10:10:21    Final result by Alejnadro Ibarra MD (02/12/18 10:10:21)                 Impression:      Appropriately positioned right PICC line.  Moderate right pleural effusion.      Electronically signed by: Alejandro Ibarra MD  Date:     02/12/18  Time:    10:10              Narrative:    AP chest compared to 2/11/18    FINDINGS: A right upper extremity PICC line has been placed and has its tip in the superior vena cava. There is a moderate right pleural effusion which is unchanged. Superimposed right basilar atelectasis or consolidation may also be present. The left lung is clear. The heart size is normal.                             CT Chest With Contrast (Final result)  Result time 02/11/18 07:40:18    Final result by Tom Sal MD (02/11/18 07:40:18)                 Impression:     Large partially posterior laterally and inferiorly loculated right-sided pleural effusion with compressive atelectasis.    No definite pneumothorax or rib fracture      Electronically signed by: TOM SAL MD  Date:     02/11/18  Time:    07:40              Narrative:    The pulmonary and final reports are concordant. Axial images are obtained through the chest. 75 cc of IV and 350 was given. Correlation is made with a decubitus chest x-ray dated February 11, 2018. A moderate size partially  posterolaterally loculated right-sided pleural effusion is evident. There is compressive atelectasis in the right base. Borderline enlarged subcarinal lower right paratracheal and right hilar lymph nodes are evident. The left lung is clear.                             X-Ray Chest PA And Lateral (Final result)  Result time 02/11/18 07:42:05    Final result by Tom Sal MD (02/11/18 07:42:05)                 Impression:     Large right-sided pleural effusion with partial loculation and associated atelectasis versus infiltration      Electronically signed by: TOM SAL MD  Date:     02/11/18  Time:    07:41              Narrative:    2 views of the chest in addition to right and left lateral decubitus views are performed at 00 43 hours and acute decubitus views were performed separately at 0048 hours on 211 2018. Images demonstrate a moderately large right-sided pleural effusion which only partially layers upon decubitus positioning. The left lung is clear. The heart is not enlarged. A CT scan was performed subsequently and is interpreted separately. There is no evidence of pneumothorax or rib fracture.                             X-Ray Chest Lateral Decubitus Right (Final result)  Result time 02/11/18 07:42:08    Final result by Tom Sal MD (02/11/18 07:42:08)                 Impression:     Large right-sided pleural effusion with partial loculation and associated atelectasis versus infiltration      Electronically signed by: TOM SAL MD  Date:     02/11/18  Time:    07:41              Narrative:    2 views of the chest in addition to right and left lateral decubitus views are performed at 00 43 hours and acute decubitus views were performed separately at 0048 hours on 211 2018. Images demonstrate a moderately large right-sided pleural effusion which only partially layers upon decubitus positioning. The left lung is clear. The heart is not enlarged. A CT scan was performed subsequently and is  interpreted separately. There is no evidence of pneumothorax or rib fracture.                             RADIOLOGY REPORT (Final result)  Result time 02/11/18 09:01:23                  Medications:     albuterol-ipratropium 2.5mg-0.5mg/3mL  3 mL Nebulization Q8H    cefTRIAXone (ROCEPHIN) IVPB  2 g Intravenous Q24H    multivitamin  1 tablet Oral Daily    pantoprazole  40 mg Oral Daily    senna-docusate 8.6-50 mg  1 tablet Oral BID    sodium chloride 0.9%  10 mL Intravenous Q6H           acetaminophen, ALPRAZolam, dextrose 50%, dextrose 50%, glucagon (human recombinant), glucose, glucose, hydrocodone-chlorpheniramine, HYDROmorphone, insulin aspart, magnesium oxide, magnesium oxide, ondansetron, oxyCODONE-acetaminophen, potassium chloride 10%, potassium chloride 10%, ramelteon, Flushing PICC Protocol **AND** sodium chloride 0.9% **AND** sodium chloride 0.9%, sodium chloride 0.9%    ASSESSMENT/PLAN:     Active Problems:    Active Hospital Problems    Diagnosis  POA    *Pleural effusion on right [J90]  Yes     Large partially posterior laterally and inferiorly loculated right-sided pleural effusion with compressive atelectasis      Nicotine dependence [F17.200]  Yes    Pneumonia [J18.9]  Yes    Empyema [J86.9]  Yes     Possible      Hyperglycemia [R73.9]  Yes    Hyponatremia [E87.1]  Yes    Hypokalemia [E87.6]  Yes    Atelectasis [J98.11]  Yes      Resolved Hospital Problems    Diagnosis Date Resolved POA   No resolved problems to display.     Abnormal CXR/CT - complicated pleural effusion  - most likely pneumonia and a complicated pleural effusion (or possible empyema)  - continue antibiotics, steroids, etc  - CXR still with fluid)  - may need surgical evaluation (his has been discussed with pt)  - await culture results  - WBC decreased and clinically he looks better but may need surgery to help clear pleural space    I will continue to follow with the pt.  Please call me at [phone] if you have any  questions.      Chase Ramirez MD

## 2018-02-14 NOTE — PROGRESS NOTES
[facility] Hospital Medicine  Progress Note    Patient Name: Zander Boyer  MRN: [id number]  Patient Class: IP- Inpatient   Admission Date: 2/10/2018  Length of Stay: 3 days  Attending Physician: Timo Kirkpatrick MD  Primary Care Provider: Primary Doctor No        Subjective:     Principal Problem:Pleural effusion on right    HPI:  Zander Boyer is a 33 yo male with no significant medical or surgical history.  He is admitted to the service of hospital medicine with a diagnosis of right pleural effusion.  He presented to the ED today per advice from Urgent Care who told him he had fluid on his lung.  His symptoms began 1 1/2 weeks ago with right sided back pain he believed was a pulled muscle.  The pain began to wrap around to his right chest and he began having nonproductive cough and chills with fever with tmax 102F.  The pain is exacerbated by coughing and lying flat. The patient also endorses SOB when lying flat. He states that he experienced mild cold symptoms about 2 wks ago, but that he hasn't been sick otherwise. He denies HA, abdomen pain, n/v and diarrhea. Patient evaluated in ER and found to have significant right pleural effusion and admitted for further evaluation and treatment.      Hospital Course:  The patient was monitored closely during his stay. He was placed on supplemental O2 and aerosol treatments. He was started on Iv rocephin for possible pneumonia and or empyema. Pulmonary was consulted. On 2/12/18, patient had a right thoracentesis done with 850 ml of fluid obtained with specimen sent to lab.    No new subjective & objective note has been filed under this hospital service since the last note was generated.    Assessment/Plan:      * Pleural effusion on right    Pneumonia/ Possible Empyema/ Atelectasis/ S/p U/s guided right thoracentesis 2/12/18-  Pt S/p right thoracentesis 2/12/18 with 850 ml fluid obtained  Continue supplemental O2   Pulmonology  following  Continue IV antibiotics - Pt currently on Iv Rocephin    Fluid for AFB, Fungus pending. c/s negative.          Hyperglycemia    Elevated blood glucose levels noted on chemistry  HGA1C was 4.9- Blood glucose levels stable without need for SSI- Accuchecks with correctional SSI discontinued  DC accuchecks.           Empyema    Possible. Initiated on IV rocephin          Pneumonia    See above        Nicotine dependence    Smoking cessation education  Pt refused nicotine patch at this time  Dangers of cigarette smoking were reviewed with patient in detail for 10 minutes and patient was encouraged to quit. Nicotine replacement options were discussed.          Atelectasis    Add incentive spirometry.           Hypokalemia    Monitor  Supplement as needed          Hyponatremia    Stable            VTE Risk Mitigation         Ordered     Low Risk of VTE  Once      02/11/18 0134     Place ADRIENNE hose  Until discontinued      02/11/18 0134          Discussed POC with patient and wife. Still with mild SWANN.   Check CRP and pro calcitonin.   Await pulmonary reevaluation     Chanel Hodge NP  Department of Hospital Medicine   [facility]

## 2018-02-14 NOTE — PLAN OF CARE
Problem: Patient Care Overview  Goal: Plan of Care Review  Outcome: Ongoing (interventions implemented as appropriate)  Pt has remained free from injury this shift, he has been medicated several times for pain, resp are even unlabored, lung sounds are diminished and wheezy, he denies any sob. Productive cough noted, he is ambulatory, voiding well, tolerating meals well. Right arm picc line flushes and draws blood from both ports no problems, dressing is intact and clean.  No fever, nausea, vomiting, encouraged to call for assistance or needs with return demonstration on use of call light.

## 2018-02-14 NOTE — ASSESSMENT & PLAN NOTE
Elevated blood glucose levels noted on chemistry  HGA1C was 4.9- Blood glucose levels stable without need for SSI- Accuchecks with correctional SSI discontinued  DC accuchecks.

## 2018-02-14 NOTE — PROGRESS NOTES
02/14/18 0800   Aerosol Therapy   $ Aerosol Therapy Charges Refused   Respiratory Treatment Status refused     Upon entering room pts girlfriend in bed with pt. Room has heavy smoke odor. Pt refused tx.

## 2018-02-14 NOTE — PROGRESS NOTES
Progress Note  PULMONARY    Admit Date: 2/10/2018   02/14/2018      SUBJECTIVE:     Feb 12, 2018 - avoids resp rx for fear of coughing, has h/o chr wheezes attributed to smoking.  February 14, patient continues to refuse breathing treatments.  Out of room getting coffee, says likely will continue smoking.  Wants to go home and go to work.  Feels better    PFSH and Allergies reviewed.    OBJECTIVE:     Vitals (Most recent):  Vitals:    02/14/18 0805   BP: (!) 97/54   Pulse: 83   Resp: 18   Temp: 99.1 °F (37.3 °C)       Vitals (24 hour range):  Temp:  [98.3 °F (36.8 °C)-100.3 °F (37.9 °C)]   Pulse:  [59-88]   Resp:  [14-20]   BP: ()/(54-59)   SpO2:  [92 %-98 %]       Intake/Output Summary (Last 24 hours) at 02/14/18 0949  Last data filed at 02/14/18 0600   Gross per 24 hour   Intake             1350 ml   Output              475 ml   Net              875 ml          Physical Exam:  The patient's neuro status (alertness,orientation,cognitive function,motor skills,), pharyngeal exam (oral lesions, hygiene, abn dentition,), Neck (jvd,mass,thyroid,nodes in neck and above/below clavicle),RESPIRATORY(symmetry,effort,fremitus,percussion,auscultation),  Cor(rhythm,heart tones including gallops,perfusion,edema)ABD(distention,hepatic&splenomegaly,tenderness,masses), Skin(rash,cyanosis),Psyc(affect,judgement,).  Exam negative except for these pertinent findings:    Robust, dull with decreased fremitus and ecursion rll, decreased bs. No edema.    Radiographs reviewed: view by direct vision  Sl improved right process - lingering problem.  February 13 and 14 chest x-ray looks mainly consolidated in the right lower lung with some effusion.  Results for orders placed during the hospital encounter of 02/10/18   X-Ray Chest 1 View for PICC_Central line    Narrative AP chest compared to 2/11/18    FINDINGS: A right upper extremity PICC line has been placed and has its tip in the superior vena cava. There is a moderate right pleural  effusion which is unchanged. Superimposed right basilar atelectasis or consolidation may also be present. The left lung is clear. The heart size is normal.    Impression Appropriately positioned right PICC line.  Moderate right pleural effusion.      Electronically signed by: Alejandro Ibarra MD  Date:     02/12/18  Time:    10:10    ]    Labs     No results for input(s): WBC, HGB, HCT, PLT, BAND, METAMYELOCYT, MYELOPCT, HGBA1C in the last 24 hours.No results for input(s): NA, K, CL, CO2, BUN, CREATININE, GLU, CALCIUM, CAION, MG, PHOS, AST, ALT, ALKPHOS, BILITOT, BILIDIR, PROT, ALBUMIN, PREALBUMIN, AMYLASE, LIPASE, CRP, HSCRP, SEDRATE, PROCAL, INR, PTT, LABHEPA, LACTATE, TROPONINI, CPK, CPKMB, MB, BNP in the last 24 hours.No results for input(s): PH, PCO2, PO2, HCO3 in the last 24 hours.  Microbiology Results (last 7 days)     Procedure Component Value Units Date/Time    AFB Culture & Smear [[id number]] Collected:  02/12/18 1621    Order Status:  Completed Specimen:  Body Fluid from Pleural Fluid Updated:  02/14/18 0927     AFB Culture & Smear Culture in progress    Culture, Respiratory [[id number]] Collected:  02/11/18 0315    Order Status:  Completed Specimen:  Respiratory from Sputum, Expectorated Updated:  02/14/18 0926     Respiratory Culture Normal respiratory marina     Gram Stain (Respiratory) >10 epithelial cells per low power field     Gram Stain (Respiratory) Many WBC's     Gram Stain (Respiratory) Many Gram positive rods     Gram Stain (Respiratory) Few Gram positive cocci     Gram Stain (Respiratory) Rare budding yeast    Culture, Body Fluid (Aerobic) w/ GS [[id number]] Collected:  02/12/18 1621    Order Status:  Completed Specimen:  Body Fluid from Pleural Fluid Updated:  02/14/18 0747     AEROBIC CULTURE - FLUID No growth     Gram Stain Result No WBC's      No organisms seen    Fungus culture [[id number]] Collected:  02/12/18 1621    Order Status:  Sent Specimen:  Body Fluid from Pleural Fluid Updated:   02/13/18 0056          Impression:  Active Hospital Problems    Diagnosis  POA    *Pleural effusion on right [J90]  Yes     Large partially posterior laterally and inferiorly loculated right-sided pleural effusion with compressive atelectasis      Nicotine dependence [F17.200]  Yes    Pneumonia [J18.9]  Yes    Empyema [J86.9]  Yes     Possible      Hyperglycemia [R73.9]  Yes    Hyponatremia [E87.1]  Yes    Hypokalemia [E87.6]  Yes    Atelectasis [J98.11]  Yes      Resolved Hospital Problems    Diagnosis Date Resolved POA   No resolved problems to display.               Plan:     Feb 12, wbc 16.3 - 11.3, still lots of volume loss and trapped lung on cxr.  Would rec scheduled neb rx. Pt knows may need surgery. Re evaluate in 2 days.    February 14, white count is reasonable.  Chest x-ray looks consolidated with effusion.  It would be reasonable to consider prolonged antibiotics and outpatient follow-up soon.  The patient is not likely to do well with his poor compliance etc.  He declines decortication now.  Pleural culture neg and glu 47.    outpt on augmentin 500 q 8- or other ok, and cxr one wk.  If fails -  needs decortication.                                    .

## 2018-02-14 NOTE — PROGRESS NOTES
[facility] Hospital Medicine  Progress Note    Patient Name: Zander Boyer  MRN: [id number]  Patient Class: IP- Inpatient   Admission Date: 2/10/2018  Length of Stay: 3 days  Attending Physician: Timo Kirkpatrick MD  Primary Care Provider: Primary Doctor No        Subjective:     Principal Problem:Pleural effusion on right    HPI:  Zander Boyer is a 33 yo male with no significant medical or surgical history.  He is admitted to the service of hospital medicine with a diagnosis of right pleural effusion.  He presented to the ED today per advice from Urgent Care who told him he had fluid on his lung.  His symptoms began 1 1/2 weeks ago with right sided back pain he believed was a pulled muscle.  The pain began to wrap around to his right chest and he began having nonproductive cough and chills with fever with tmax 102F.  The pain is exacerbated by coughing and lying flat. The patient also endorses SOB when lying flat. He states that he experienced mild cold symptoms about 2 wks ago, but that he hasn't been sick otherwise. He denies HA, abdomen pain, n/v and diarrhea. Patient evaluated in ER and found to have significant right pleural effusion and admitted for further evaluation and treatment.      Hospital Course:  The patient was monitored closely during his stay. He was placed on supplemental O2 and aerosol treatments. He was started on Iv rocephin for possible pneumonia and or empyema. Pulmonary was consulted. On 2/12/18, patient had a right thoracentesis done with 850 ml of fluid obtained with specimen sent to lab.    Interval History: no new issues overnight. Patient S/p  right thoracentesis 2/12/0217  with 850 ml fluid obtained. Fluid for culture- negative and  AFB, and fungus still pending. Still with mild SWANN and cough.   Going outside to smoke. Counseled on smoking cessation. Wife at     Review of Systems   Constitutional: Positive for fatigue. Negative for chills and fever.    HENT: Negative for ear pain and hearing loss.    Respiratory: Positive for cough and shortness of breath. Negative for apnea, choking, chest tightness, wheezing and stridor.    Cardiovascular: Positive for chest pain (Right sided ). Negative for palpitations and leg swelling.   Gastrointestinal: Negative for abdominal pain and blood in stool.   Genitourinary: Negative for frequency and hematuria.   Musculoskeletal: Positive for back pain (Right sided). Negative for neck pain and neck stiffness.   Neurological: Negative for syncope and weakness.   Psychiatric/Behavioral: Negative for agitation, confusion and suicidal ideas. The patient is not nervous/anxious.      Objective:     Vital Signs (Most Recent):  Temp: 98.2 °F (36.8 °C) (02/14/18 1134)  Pulse: 72 (02/14/18 1134)  Resp: 18 (02/14/18 1134)  BP: (!) 104/58 (02/14/18 1134)  SpO2: (!) 93 % (02/14/18 1134) Vital Signs (24h Range):  Temp:  [98.2 °F (36.8 °C)-100.3 °F (37.9 °C)] 98.2 °F (36.8 °C)  Pulse:  [59-88] 72  Resp:  [14-20] 18  SpO2:  [92 %-98 %] 93 %  BP: ()/(54-59) 104/58     Weight: 105.3 kg (232 lb 3.2 oz)  Body mass index is 28.26 kg/m².    Physical Exam   Constitutional: He is oriented to person, place, and time. He appears well-developed and well-nourished. No distress.   HENT:   Head: Normocephalic and atraumatic.   Eyes: Conjunctivae and EOM are normal. Pupils are equal, round, and reactive to light.   Neck: Normal range of motion. Neck supple.   Cardiovascular: Normal rate, regular rhythm, normal heart sounds and intact distal pulses.  Exam reveals no gallop.    No murmur heard.  Pulses:       Dorsalis pedis pulses are 2+ on the right side, and 2+ on the left side.   Pulmonary/Chest: Effort normal. No accessory muscle usage. No respiratory distress. He has decreased breath sounds in the right middle field and the right lower field. He has no rhonchi. He has no rales.   significantly decreased on the right. Coarse    Abdominal: Soft. Bowel  sounds are normal. He exhibits no distension. There is no tenderness. There is no guarding.   Genitourinary:   Genitourinary Comments: Not examined   Musculoskeletal: Normal range of motion. He exhibits no edema, tenderness or deformity.   Neurological: He is alert and oriented to person, place, and time. He has normal strength. No cranial nerve deficit.   Skin: Skin is warm, dry and intact. Capillary refill takes less than 2 seconds. He is not diaphoretic. PICC intact to upper ext  Psychiatric: He has a normal mood and affect. His speech is normal and behavior is normal. Judgment and thought content normal.   Vitals reviewed.        CRANIAL NERVES     CN III, IV, VI   Pupils are equal, round, and reactive to light.  Extraocular motions are normal.      Labs: Reviewed          Assessment/Plan:      * Pleural effusion on right    Pneumonia/ Possible Empyema/ Atelectasis/ S/p U/s guided right thoracentesis 2/12/18-  Pt S/p right thoracentesis 2/12/18 with 850 ml fluid obtained  Continue supplemental O2   Pulmonology following  Continue IV antibiotics - Pt currently on Iv Rocephin    Fluid for AFB, Fungus pending. c/s negative.          Hyperglycemia    Elevated blood glucose levels noted on chemistry  HGA1C was 4.9- Blood glucose levels stable without need for SSI- Accuchecks with correctional SSI discontinued  DC accuchecks.           Empyema    Possible. Initiated on IV rocephin          Pneumonia    See above        Nicotine dependence    Smoking cessation education  Pt refused nicotine patch at this time  Dangers of cigarette smoking were reviewed with patient in detail for 10 minutes and patient was encouraged to quit. Nicotine replacement options were discussed.          Atelectasis    Add incentive spirometry.           Hypokalemia    Monitor  Supplement as needed          Hyponatremia    Stable            VTE Risk Mitigation         Ordered     Low Risk of VTE  Once      02/11/18 0134     Place ADRIENNE hose  Until  discontinued      02/11/18 0134              Chanel Hodge NP  Department of Hospital Medicine   [facility]

## 2018-02-14 NOTE — NURSING
PICC line removed per MD order, discharge instructions were given and discussed, pt was encouraged and educated on the importance of the follow up with , he was given the abx Rx, he denies having any personal belongings with pharmacy or security. He was asked to wait 30 after PICC removal to leave he verbalized understanding.

## 2018-02-14 NOTE — ASSESSMENT & PLAN NOTE
Smoking cessation education  Pt refused nicotine patch at this time  Dangers of cigarette smoking were reviewed with patient in detail for 10 minutes and patient was encouraged to quit. Nicotine replacement options were discussed.

## 2018-02-15 NOTE — DISCHARGE SUMMARY
[facility] Hospital Medicine  Discharge Summary      Patient Name: Zander Boyer  MRN: [id number]  Admission Date: 2/10/2018  Hospital Length of Stay: 3 days  Discharge Date and Time: 2/14/2018  8:42 PM  Attending Physician: No att. providers found   Discharging Provider: Chanel Hodge NP  Primary Care Provider: Primary Doctor Leatha      HPI:   Zander Boyer is a 33 yo male with no significant medical or surgical history.  He is admitted to the service of hospital medicine with a diagnosis of right pleural effusion.  He presented to the ED today per advice from Urgent Care who told him he had fluid on his lung.  His symptoms began 1 1/2 weeks ago with right sided back pain he believed was a pulled muscle.  The pain began to wrap around to his right chest and he began having nonproductive cough and chills with fever with tmax 102F.  The pain is exacerbated by coughing and lying flat. The patient also endorses SOB when lying flat. He states that he experienced mild cold symptoms about 2 wks ago, but that he hasn't been sick otherwise. He denies HA, abdomen pain, n/v and diarrhea. Patient evaluated in ER and found to have significant right pleural effusion and admitted for further evaluation and treatment.      * No surgery found *      Hospital Course:   The patient was monitored closely during his stay. He was placed on supplemental O2 and aerosol treatments. He was started on Iv rocephin for possible pneumonia and or empyema. Pulmonary was consulted. On 2/12/18, patient had a right thoracentesis done with 850 ml of fluid obtained with specimen sent to lab. Serial CXR obtained. Patient cleared for discharge from pulmonary standpoint on oral augmentin. He does not want any further procedures at this time and is ambulating outside to smoke. He was instructed to return to ED for increased dyspnea.      Consults:   Consults         Status Ordering Provider     Inpatient consult to  Pulmonology  Once     Provider:  Chase Ramirez MD    Completed ASHLEIGH DOCKERY          No new Assessment & Plan notes have been filed under this hospital service since the last note was generated.  Service: Hospital Medicine    Final Active Diagnoses:    Diagnosis Date Noted POA    PRINCIPAL PROBLEM:  Pleural effusion on right [J90] 02/11/2018 Yes    Nicotine dependence [F17.200] 02/12/2018 Yes    Pneumonia [J18.9] 02/12/2018 Yes    Empyema [J86.9] 02/12/2018 Yes    Hyperglycemia [R73.9] 02/12/2018 Yes    Hyponatremia [E87.1] 02/11/2018 Yes    Hypokalemia [E87.6] 02/11/2018 Yes    Atelectasis [J98.11] 02/11/2018 Yes      Problems Resolved During this Admission:    Diagnosis Date Noted Date Resolved POA       Discharged Condition: stable    Disposition: Home or Self Care    Follow Up:  Follow-up Information     Ilan Monsivais MD In 1 week.    Specialty:  Pulmonary Disease  Why:  hospital follow up  Contact information:  [facility]  [address] FLOOR  [address] [place] [id number]  [phone]                 Patient Instructions:     X-Ray Chest 1 View   Standing Status: Future  Standing Exp. Date: 02/14/19   Order Specific Question Answer Comments   Reason for Exam: pneumonia    May the Radiologist modify the order per protocol to meet the clinical needs of the patient? Yes      Diet Adult Regular     Activity as tolerated     Notify your health care provider if you experience any of the following:  severe uncontrolled pain     Notify your health care provider if you experience any of the following:  persistent nausea and vomiting or diarrhea     Notify your health care provider if you experience any of the following:  difficulty breathing or increased cough     Notify your health care provider if you experience any of the following:  persistent dizziness, light-headedness, or visual disturbances         Significant Diagnostic Studies: Labs: BMP: No results for input(s): GLU, NA, K, CL, CO2, BUN,  CREATININE, CALCIUM, MG in the last 48 hours. and CMP No results for input(s): NA, K, CL, CO2, GLU, BUN, CREATININE, CALCIUM, PROT, ALBUMIN, BILITOT, ALKPHOS, AST, ALT, ANIONGAP, ESTGFRAFRICA, EGFRNONAA in the last 48 hours.  Radiology: X-Ray: CXR: X-Ray Chest 1 View (CXR):   Results for orders placed or performed during the hospital encounter of 02/10/18   X-Ray Chest 1 View    Narrative    Single view chest portable was obtained at 13:36. Comparison is made with the most recent chest x-ray February 13, 2018. A PICC enters on the right and ends in the superior vena cava. The cardiac size is still within normal limits but has increased in size from the prior studies and a pericardial effusion is suspected. There is increased density in the right lung consistent with infiltrate and atelectasis and increased density in the right hemithorax probably due to recurrent pleural effusion. Left lung remains clear. No pneumothorax is seen.    Impression     Increasing density in the right lower lobe and increased density in the right hemithorax consistent with infiltrate and recurrent pleural effusion. No pneumothorax seen. Increased cardiac size may be due to a pericardial effusion. PICC in position.       Electronically signed by: Elias Holguin MD  Date:     02/14/18  Time:    13:56        Pending Diagnostic Studies:     None         Medications:  Reconciled Home Medications:   Discharge Medication List as of 2/14/2018  5:27 PM      START taking these medications    Details   amoxicillin-clavulanate 500-125mg (AUGMENTIN) 500-125 mg Tab Take 1 tablet (500 mg total) by mouth every 8 (eight) hours., Starting Wed 2/14/2018, Print             Indwelling Lines/Drains at time of discharge:   Lines/Drains/Airways          No matching active lines, drains, or airways          Time spent on the discharge of patient: 32 minutes  Patient was seen and examined on the date of discharge and determined to be suitable for discharge.          Chanel Hodge NP  Department of Hospital Medicine  [facility]

## 2018-02-15 NOTE — PLAN OF CARE
02/15/18 1449   Final Note   Assessment Type Final Discharge Note   Discharge Disposition Home

## 2018-02-16 LAB
BACTERIA FLD AEROBE CULT: NO GROWTH
GRAM STN SPEC: NORMAL
GRAM STN SPEC: NORMAL

## 2018-03-15 LAB — FUNGUS SPEC CULT: NORMAL

## 2018-04-17 LAB
ACID FAST MOD KINY STN SPEC: NORMAL
MYCOBACTERIUM SPEC QL CULT: NORMAL